# Patient Record
Sex: MALE | Race: WHITE | NOT HISPANIC OR LATINO | Employment: OTHER | URBAN - METROPOLITAN AREA
[De-identification: names, ages, dates, MRNs, and addresses within clinical notes are randomized per-mention and may not be internally consistent; named-entity substitution may affect disease eponyms.]

---

## 2019-03-08 ENCOUNTER — RECORDS - HEALTHEAST (OUTPATIENT)
Dept: GENERAL RADIOLOGY | Facility: CLINIC | Age: 64
End: 2019-03-08

## 2019-03-08 ENCOUNTER — OFFICE VISIT - HEALTHEAST (OUTPATIENT)
Dept: FAMILY MEDICINE | Facility: CLINIC | Age: 64
End: 2019-03-08

## 2019-03-08 DIAGNOSIS — S92.532A CLOSED DISPLACED FRACTURE OF DISTAL PHALANX OF LESSER TOE OF LEFT FOOT, INITIAL ENCOUNTER: ICD-10-CM

## 2019-03-08 DIAGNOSIS — S99.922A TOE INJURY, LEFT, INITIAL ENCOUNTER: ICD-10-CM

## 2019-03-08 DIAGNOSIS — S99.922A UNSPECIFIED INJURY OF LEFT FOOT, INITIAL ENCOUNTER: ICD-10-CM

## 2019-03-08 ASSESSMENT — MIFFLIN-ST. JEOR: SCORE: 1725.45

## 2019-03-18 ENCOUNTER — RECORDS - HEALTHEAST (OUTPATIENT)
Dept: ADMINISTRATIVE | Facility: OTHER | Age: 64
End: 2019-03-18

## 2021-06-02 VITALS — WEIGHT: 189.75 LBS | BODY MASS INDEX: 24.35 KG/M2 | HEIGHT: 74 IN

## 2021-06-24 NOTE — PROGRESS NOTES
"[facility] Note    Patient Name: Rito Solano  Patient Age: 63 y.o.  YOB: 1955  MRN: [id number]    Date of visit: 3/8/2019    Assessment/Plan:  No results found for this or any previous visit (from the past 24 hour(s)).  No medications were ordered this encounter      ICD-10-CM    1. Toe injury, left, initial encounter S99.922A XR Toe Left 2 or More VWS       Toe injury is pretty asymptomatic at this point, we will do an x-ray to rule out a fracture.  If no fracture, I recommend starting to ambulate.  If there is a fracture, we could use a flat shoe and/or kimani taping.    I did call patient regarding his fracture, I recommended orthopedic evaluation.  I also advised him to use kimani taping and flat bottom shoe for healing.  I advised him that he can make him here and we can do this for him if he would like.  Patient Instructions   If swelling is increasing, seek medical attention.      Counseled patient regarding treatments, treatment options, risks and benefits and diagnosis.  The patient was interactive, attentive, verbalized understanding, and we discussed plan.       There is no problem list on file for this patient.    Social History     Social History Narrative     Not on file     No family history on file.  No outpatient encounter medications on file as of 3/8/2019.     No facility-administered encounter medications on file as of 3/8/2019.        Chief Complaint:   Chief Complaint   Patient presents with     Toe Injury     Broke middle toe on the left foot x 2 1/2 weeks ago. Yesterday the front of the foot is swelling up.        /68 (Patient Site: Left Arm, Patient Position: Sitting, Cuff Size: Adult Regular)   Pulse 77   Ht 6' 2\" (1.88 m)   Wt 189 lb 12 oz (86.1 kg)   SpO2 99%   BMI 24.36 kg/m    HPI:   2 weeks ago 3rd left ip joint buckled under after he stood.  Felt pain immediately.  Has not had much pain walking but has been staying off of it somewhat to help it heal.  " "Continues to be swollen.  It is improving.  Yesterday felt a little swelling at forefoot.      No numbness.    ROS: Pertinent ros findings in hpi, all other systems negative.    Objective/Physical Exam:     /68 (Patient Site: Left Arm, Patient Position: Sitting, Cuff Size: Adult Regular)   Pulse 77   Ht 6' 2\" (1.88 m)   Wt 189 lb 12 oz (86.1 kg)   SpO2 99%   BMI 24.36 kg/m      Gen: NAD, appears age  Skin: warm, dry  HENT: normocephalic atraumatic, MMM  Eyes: non-icteric, no proptosis  CV: NRRR no m/r/g, no peripheral edema  Resp: CTAB no w/r/r, normal respiratory effort  Abd: non-distended, soft  Hematologic: No petechiae or purpura  MSK: no muscle or joint swelling  Neuro: no dysarthria or gross asymmetry  Psych: Cooperative, full affect    Left third toe distally has mild swelling, good cap refill.  Good range of motion, strength seems intact.  Not tender to palpation not tender with movement.  There is no appreciable swelling in the rest of the foot.  Dorsalis pedis is 2+.  No other edema.      Amos Sam MD  "

## 2021-07-03 NOTE — ADDENDUM NOTE
Addendum Note by Yoni Sam MD at 3/8/2019  9:20 AM     Author: Yoni Sam MD Service: -- Author Type: Physician    Filed: 3/8/2019 11:03 AM Encounter Date: 3/8/2019 Status: Signed    : Yoni Sam MD (Physician)    Addended by: OYNI SAM on: 3/8/2019 11:03 AM        Modules accepted: Orders

## 2021-10-01 ENCOUNTER — OFFICE VISIT (OUTPATIENT)
Dept: FAMILY MEDICINE | Facility: CLINIC | Age: 66
End: 2021-10-01
Payer: COMMERCIAL

## 2021-10-01 VITALS
DIASTOLIC BLOOD PRESSURE: 76 MMHG | BODY MASS INDEX: 23.71 KG/M2 | SYSTOLIC BLOOD PRESSURE: 130 MMHG | WEIGHT: 184.75 LBS | HEART RATE: 91 BPM | HEIGHT: 74 IN | RESPIRATION RATE: 17 BRPM | TEMPERATURE: 98.1 F | OXYGEN SATURATION: 96 %

## 2021-10-01 DIAGNOSIS — M25.551 BILATERAL HIP PAIN: Primary | ICD-10-CM

## 2021-10-01 DIAGNOSIS — M25.552 BILATERAL HIP PAIN: Primary | ICD-10-CM

## 2021-10-01 PROCEDURE — 99214 OFFICE O/P EST MOD 30 MIN: CPT | Performed by: FAMILY MEDICINE

## 2021-10-01 ASSESSMENT — MIFFLIN-ST. JEOR: SCORE: 1702.77

## 2021-10-01 ASSESSMENT — PAIN SCALES - GENERAL: PAINLEVEL: MILD PAIN (2)

## 2021-10-12 ENCOUNTER — TRANSFERRED RECORDS (OUTPATIENT)
Dept: HEALTH INFORMATION MANAGEMENT | Facility: CLINIC | Age: 66
End: 2021-10-12

## 2021-11-17 ENCOUNTER — TRANSFERRED RECORDS (OUTPATIENT)
Dept: HEALTH INFORMATION MANAGEMENT | Facility: CLINIC | Age: 66
End: 2021-11-17
Payer: COMMERCIAL

## 2022-03-28 ENCOUNTER — OFFICE VISIT (OUTPATIENT)
Dept: FAMILY MEDICINE | Facility: CLINIC | Age: 67
End: 2022-03-28
Payer: COMMERCIAL

## 2022-03-28 ENCOUNTER — NURSE TRIAGE (OUTPATIENT)
Dept: NURSING | Facility: CLINIC | Age: 67
End: 2022-03-28

## 2022-03-28 VITALS
WEIGHT: 187.19 LBS | DIASTOLIC BLOOD PRESSURE: 72 MMHG | BODY MASS INDEX: 24.03 KG/M2 | OXYGEN SATURATION: 99 % | SYSTOLIC BLOOD PRESSURE: 126 MMHG | HEART RATE: 74 BPM

## 2022-03-28 DIAGNOSIS — R00.2 PALPITATIONS: Primary | ICD-10-CM

## 2022-03-28 LAB
ALBUMIN SERPL-MCNC: 4.2 G/DL (ref 3.5–5)
ALP SERPL-CCNC: 68 U/L (ref 45–120)
ALT SERPL W P-5'-P-CCNC: 19 U/L (ref 0–45)
ANION GAP SERPL CALCULATED.3IONS-SCNC: 9 MMOL/L (ref 5–18)
AST SERPL W P-5'-P-CCNC: 20 U/L (ref 0–40)
BASOPHILS # BLD AUTO: 0 10E3/UL (ref 0–0.2)
BASOPHILS NFR BLD AUTO: 0 %
BILIRUB SERPL-MCNC: 0.6 MG/DL (ref 0–1)
BUN SERPL-MCNC: 17 MG/DL (ref 8–22)
CALCIUM SERPL-MCNC: 9.8 MG/DL (ref 8.5–10.5)
CHLORIDE BLD-SCNC: 101 MMOL/L (ref 98–107)
CO2 SERPL-SCNC: 31 MMOL/L (ref 22–31)
CREAT SERPL-MCNC: 0.81 MG/DL (ref 0.7–1.3)
EOSINOPHIL # BLD AUTO: 0.1 10E3/UL (ref 0–0.7)
EOSINOPHIL NFR BLD AUTO: 1 %
ERYTHROCYTE [DISTWIDTH] IN BLOOD BY AUTOMATED COUNT: 11.7 % (ref 10–15)
GFR SERPL CREATININE-BSD FRML MDRD: >90 ML/MIN/1.73M2
GLUCOSE BLD-MCNC: 96 MG/DL (ref 70–125)
HCT VFR BLD AUTO: 40.4 % (ref 40–53)
HGB BLD-MCNC: 13.9 G/DL (ref 13.3–17.7)
IMM GRANULOCYTES # BLD: 0 10E3/UL
IMM GRANULOCYTES NFR BLD: 0 %
LYMPHOCYTES # BLD AUTO: 1.5 10E3/UL (ref 0.8–5.3)
LYMPHOCYTES NFR BLD AUTO: 28 %
MCH RBC QN AUTO: 31 PG (ref 26.5–33)
MCHC RBC AUTO-ENTMCNC: 34.4 G/DL (ref 31.5–36.5)
MCV RBC AUTO: 90 FL (ref 78–100)
MONOCYTES # BLD AUTO: 0.5 10E3/UL (ref 0–1.3)
MONOCYTES NFR BLD AUTO: 8 %
NEUTROPHILS # BLD AUTO: 3.3 10E3/UL (ref 1.6–8.3)
NEUTROPHILS NFR BLD AUTO: 62 %
PLATELET # BLD AUTO: 120 10E3/UL (ref 150–450)
POTASSIUM BLD-SCNC: 4.2 MMOL/L (ref 3.5–5)
PROT SERPL-MCNC: 7.1 G/DL (ref 6–8)
RBC # BLD AUTO: 4.48 10E6/UL (ref 4.4–5.9)
SODIUM SERPL-SCNC: 141 MMOL/L (ref 136–145)
TSH SERPL DL<=0.005 MIU/L-ACNC: 1.08 UIU/ML (ref 0.3–5)
WBC # BLD AUTO: 5.3 10E3/UL (ref 4–11)

## 2022-03-28 PROCEDURE — 36415 COLL VENOUS BLD VENIPUNCTURE: CPT | Performed by: FAMILY MEDICINE

## 2022-03-28 PROCEDURE — 93005 ELECTROCARDIOGRAM TRACING: CPT | Performed by: FAMILY MEDICINE

## 2022-03-28 PROCEDURE — 80050 GENERAL HEALTH PANEL: CPT | Performed by: FAMILY MEDICINE

## 2022-03-28 PROCEDURE — 99213 OFFICE O/P EST LOW 20 MIN: CPT | Performed by: FAMILY MEDICINE

## 2022-03-28 NOTE — TELEPHONE ENCOUNTER
Triage call:     Patient calling with irregular heart beat  He feels it is beating faster than normal. He reports HR as 90 when he checks his pulse at home.   He first noticed this irregular beating 3/25.   Denies feeling dizzy, lightheaded or weak   Denies chest pain at the time of the call but he does report a slight burning sensation with some pressure in his chest that comes and goes, lasts a few minutes. He last felt this sensation last night.   He feels the irregular beats happen >4 times/min.   No history of cardiac issues. Per protocol, advised patient to be seen in office today. Care advice given. Patient verbalizes understanding and agreement with this plan. Discussed walk in clinic if no appointments available.   Transferred to scheduling.     Flakita Gibson RN   03/28/22 8:52 AM  [facility] Nurse Advisor    Reason for Disposition    Skipped or extra beat(s) and occurs 4 or more times per minute    Additional Information    Negative: Passed out (i.e., fainted, collapsed and was not responding)    Negative: Shock suspected (e.g., cold/pale/clammy skin, too weak to stand, low BP, rapid pulse)    Negative: Difficult to awaken or acting confused (e.g., disoriented, slurred speech)    Negative: Visible sweat on face or sweat dripping down face    Negative: Unable to walk, or can only walk with assistance (e.g., requires support)    Negative: Received SHOCK from implantable cardiac defibrillator and has persisting symptoms (i.e., palpitations, lightheadedness)    Negative: Dizziness, lightheadedness, or weakness and heart beating very rapidly (e.g., > 140 / minute)    Negative: Dizziness, lightheadedness, or weakness and heart beating very slowly (e.g., < 50 / minute)    Negative: Sounds like a life-threatening emergency to the triager    Negative: Chest pain    Negative: Difficulty breathing    Negative: Dizziness, lightheadedness, or weakness    Negative: Heart beating very rapidly (e.g., > 140 /  minute) and present now (Exception: during exercise)    Negative: Heart beating very slowly (e.g., < 50 / minute) (Exception: athlete)    Negative: New or worsened shortness of breath with activity (dyspnea on exertion)    Negative: Patient sounds very sick or weak to the triager    Negative: Wearing a 'Holter monitor' or 'cardiac event monitor'    Negative: Received SHOCK from implantable cardiac defibrillator (and now feels well)    Negative: Heart beating very rapidly (e.g., > 140 / minute) and not present now (Exception: during exercise)    Negative: Skipped or extra beat(s) and increases with exercise or exertion    Protocols used: HEART RATE AND HEARTBEAT [id number]

## 2022-03-28 NOTE — PROGRESS NOTES
Assessment & Plan     Palpitations    His EKG is totally normal here today, and as I listen to his heart there is no extraneous beats or irregular beats.  However he certainly could still be having some abnormalities so we will go ahead and get an Holter monitor and see what that looks like.  We can also get some blood work as listed below to make sure he is not having thyroid issues or other metabolic issues.    - EKG 12-lead, tracing only  - CBC with platelets and differential; Future  - TSH; Future  - Comprehensive metabolic panel (BMP + Alb, Alk Phos, ALT, AST, Total. Bili, TP); Future  - CBC with platelets and differential  - TSH  - Comprehensive metabolic panel (BMP + Alb, Alk Phos, ALT, AST, Total. Bili, TP)    Review of external notes as documented elsewhere in note  Review of the result(s) of each unique test - labs, holter  Ordering of each unique test  Prescription drug management             No follow-ups on file.    Fausto Martinez MD  [facility]    Calin Veras is a 66 year old who presents for the following health issues     History of Present Illness       Vascular Disease:  He presents for follow up of vascular disease.  He never takes nitroglycerin. He is not taking daily aspirin.    He eats 4 or more servings of fruits and vegetables daily.He consumes 1 sweetened beverage(s) daily.He exercises with enough effort to increase his heart rate 9 or less minutes per day.  He exercises with enough effort to increase his heart rate 3 or less days per week.   He is taking medications regularly.     He is having some irregular heartbeat feelings over the last several weeks.  He has not really had a problem with any heart problems in the past.  He has noticed a few spells of what he perceives as either premature beats or periods of irregularity they are very short.  They are not accompanied with shortness of breath or chest pain.  He just feels a heavy beat every once in a  while and wonders about if it is concerning or what should be done about it.          Review of Systems   Constitutional, HEENT, cardiovascular, pulmonary, gi and gu systems are negative, except as otherwise noted.      Objective    /72 (BP Location: Left arm, Patient Position: Sitting, Cuff Size: Adult Regular)   Pulse 74   Wt 84.9 kg (187 lb 3 oz)   SpO2 99%   BMI 24.03 kg/m    Body mass index is 24.03 kg/m .  Physical Exam   GENERAL: healthy, alert and no distress  NECK: no adenopathy, no asymmetry, masses, or scars and thyroid normal to palpation  RESP: lungs clear to auscultation - no rales, rhonchi or wheezes  CV: regular rate and rhythm, normal S1 S2, no S3 or S4, no murmur, click or rub, no peripheral edema and peripheral pulses strong  ABDOMEN: soft, nontender, no hepatosplenomegaly, no masses and bowel sounds normal  MS: no gross musculoskeletal defects noted, no edema    Results for orders placed or performed in visit on 03/28/22 (from the past 24 hour(s))   CBC with platelets and differential    Narrative    The following orders were created for panel order CBC with platelets and differential.  Procedure                               Abnormality         Status                     ---------                               -----------         ------                     CBC with platelets and d...[[id number]]  Abnormal            Final result                 Please view results for these tests on the individual orders.   CBC with platelets and differential   Result Value Ref Range    WBC Count 5.3 4.0 - 11.0 10e3/uL    RBC Count 4.48 4.40 - 5.90 10e6/uL    Hemoglobin 13.9 13.3 - 17.7 g/dL    Hematocrit 40.4 40.0 - 53.0 %    MCV 90 78 - 100 fL    MCH 31.0 26.5 - 33.0 pg    MCHC 34.4 31.5 - 36.5 g/dL    RDW 11.7 10.0 - 15.0 %    Platelet Count 120 (L) 150 - 450 10e3/uL    % Neutrophils 62 %    % Lymphocytes 28 %    % Monocytes 8 %    % Eosinophils 1 %    % Basophils 0 %    % Immature Granulocytes 0 %     Absolute Neutrophils 3.3 1.6 - 8.3 10e3/uL    Absolute Lymphocytes 1.5 0.8 - 5.3 10e3/uL    Absolute Monocytes 0.5 0.0 - 1.3 10e3/uL    Absolute Eosinophils 0.1 0.0 - 0.7 10e3/uL    Absolute Basophils 0.0 0.0 - 0.2 10e3/uL    Absolute Immature Granulocytes 0.0 <=0.4 10e3/uL

## 2022-03-28 NOTE — PROGRESS NOTES
Answers for HPI/ROS submitted by the patient on 3/28/2022  Nitroglycerin use:: never  Do you take an aspirin every day?: No  How many servings of fruits and vegetables do you eat daily?: 4 or more  On average, how many sweetened beverages do you drink each day (Examples: soda, juice, sweet tea, etc.  Do NOT count diet or artificially sweetened beverages)?: 1  How many minutes a day do you exercise enough to make your heart beat faster?: 9 or less  How many days a week do you exercise enough to make your heart beat faster?: 3 or less  How many days per week do you miss taking your medication?: 0

## 2022-04-02 ENCOUNTER — HEALTH MAINTENANCE LETTER (OUTPATIENT)
Age: 67
End: 2022-04-02

## 2022-04-06 ENCOUNTER — TELEPHONE (OUTPATIENT)
Dept: FAMILY MEDICINE | Facility: CLINIC | Age: 67
End: 2022-04-06
Payer: COMMERCIAL

## 2022-04-06 DIAGNOSIS — R00.2 PALPITATIONS: Primary | ICD-10-CM

## 2022-04-06 NOTE — TELEPHONE ENCOUNTER
Reason for Call: Request for an order    Order or referral being requested: Holter Monitor    Date needed: as soon as possible     Has the patient been seen by the PCP for this problem? YES    Additional comments:   Per Dr. Martinez last office visit notes, patient was to receive a Holter Monitor. Patient states he has not been contacted by Cardiologist to set one up. Patient would like an order put in for one. I also provided the phone number for Cardiology to patient to have him call them in case he does not here from them soon.    Phone number Patient can be reached at:  Cell number on file:    Telephone Information:   Mobile [phone]       Best Time:  anytime    Can we leave a detailed message on this number?  NO    Call taken on 4/6/2022 at 9:45 AM by Gris Kan

## 2022-04-11 ENCOUNTER — HOSPITAL ENCOUNTER (OUTPATIENT)
Dept: CARDIOLOGY | Facility: CLINIC | Age: 67
Discharge: HOME | End: 2022-04-11
Attending: FAMILY MEDICINE | Admitting: FAMILY MEDICINE
Payer: COMMERCIAL

## 2022-04-11 DIAGNOSIS — R00.2 PALPITATIONS: ICD-10-CM

## 2022-04-11 PROCEDURE — 93225 XTRNL ECG REC<48 HRS REC: CPT

## 2022-04-15 PROCEDURE — 93227 XTRNL ECG REC<48 HR R&I: CPT | Performed by: INTERNAL MEDICINE

## 2022-06-08 ENCOUNTER — NURSE TRIAGE (OUTPATIENT)
Dept: NURSING | Facility: CLINIC | Age: 67
End: 2022-06-08
Payer: COMMERCIAL

## 2022-06-08 ENCOUNTER — OFFICE VISIT (OUTPATIENT)
Dept: FAMILY MEDICINE | Facility: CLINIC | Age: 67
End: 2022-06-08
Payer: COMMERCIAL

## 2022-06-08 VITALS
BODY MASS INDEX: 23.11 KG/M2 | SYSTOLIC BLOOD PRESSURE: 134 MMHG | WEIGHT: 180 LBS | OXYGEN SATURATION: 98 % | RESPIRATION RATE: 16 BRPM | TEMPERATURE: 98 F | DIASTOLIC BLOOD PRESSURE: 77 MMHG | HEART RATE: 76 BPM

## 2022-06-08 DIAGNOSIS — K40.90 NON-RECURRENT UNILATERAL INGUINAL HERNIA WITHOUT OBSTRUCTION OR GANGRENE: Primary | ICD-10-CM

## 2022-06-08 PROCEDURE — 99213 OFFICE O/P EST LOW 20 MIN: CPT | Performed by: PHYSICIAN ASSISTANT

## 2022-06-08 NOTE — TELEPHONE ENCOUNTER
"Hernia pain patient reports.  On right side , below navel 2 \"    Feeling pain in right testicle as well.  Been having this pain for 2 days.    Patient states he is having normal activities   During the day, but is restricting activity during the day.    Perlita Bobby RN   [facility] Nurse Advisor    Reason for Disposition    MILD pain that comes and goes (cramps) lasts > 24 hours    Age > 60 years    Additional Information    Negative: Passed out (i.e., fainted, collapsed and was not responding)    Negative: Shock suspected (e.g., cold/pale/clammy skin, too weak to stand, low BP, rapid pulse)    Negative: Sounds like a life-threatening emergency to the triager    Negative: Chest pain    Negative: Pain is mainly in upper abdomen (if needed ask: 'is it mainly above the belly button?')    Negative: SEVERE abdominal pain (e.g., excruciating)    Negative: Vomiting red blood or black (coffee ground) material    Negative: Bloody, black, or tarry bowel movements (Exception: chronic-unchanged black-grey bowel movements and is taking iron pills or Pepto-bismol)    Negative: Unable to urinate (or only a few drops) and bladder feels very full    Negative: Pain in scrotum persists > 1 hour    Negative: Constant abdominal pain lasting > 2 hours    Negative: Vomiting bile (green color)    Negative: Patient sounds very sick or weak to the triager    Negative: Vomiting and abdomen looks much more swollen than usual    Negative: White of the eyes have turned yellow (i.e., jaundice)    Negative: Blood in urine (red, pink, or tea-colored)    Negative: Fever > 103 F (39.4 C)    Negative: Fever > 101 F (38.3 C) and over 60 years of age    Negative: Fever > 100.0 F (37.8 C) and has diabetes mellitus or a weak immune system (e.g., HIV positive, cancer chemotherapy, organ transplant, splenectomy, chronic steroids)    Negative: Fever > 100.0 F (37.8 C) and bedridden (e.g., nursing home patient, stroke, chronic illness, recovering " from surgery)    Protocols used: ABDOMINAL PAIN - MALE-A-OH

## 2022-06-08 NOTE — PROGRESS NOTES
Assessment & Plan:      Problem List Items Addressed This Visit    None     Visit Diagnoses     Non-recurrent unilateral inguinal hernia without obstruction or gangrene    -  Primary    Relevant Orders    Adult General Surg Referral        Medical Decision Making  Patient presents with acute onset painful mass in the right groin.  Physical exam shows signs consistent with inguinal hernia.  Given that patient is having symptoms just with short periods of standing, recommend follow-up with general surgery for likely surgical repair.  Placed referral for general surgery.  Discussed signs of worsening symptoms and when to be seen immediately for reevaluation if needed.     Subjective:      Rito Solano is a 66 year old male here for evaluation of right groin pain with a bulging mass and pain rating down into the right testicle.  Onset of symptoms was 2 days ago.  Symptoms worsen after patient is standing for long period of time.  Symptoms improve as patient sits, and completely resolves after patient sleeps at night.  Patient does not recall any specific injury, and has not been doing any heavy lifting.  He is passing his bowels normally with no dysuria.  Pain is rated 1-2 out of 10 in severity.     The following portions of the patient's history were reviewed and updated as appropriate: allergies, current medications, and problem list.     Review of Systems  Pertinent items are noted in HPI.    Allergies  No Known Allergies    Family History   Problem Relation Age of Onset     Diabetes Father      Coronary Artery Disease Brother      Heart Disease Brother      Coronary Artery Disease Brother      Heart Disease Brother        Social History     Tobacco Use     Smoking status: Never Smoker     Smokeless tobacco: Never Used   Substance Use Topics     Alcohol use: Yes     Comment: 1-2 / week        Objective:      /77   Pulse 76   Temp 98  F (36.7  C)   Resp 16   Wt 81.6 kg (180 lb)   SpO2 98%   BMI 23.11  kg/m    General appearance - alert, well appearing, and in no distress and non-toxic   Male - Tender, bulging mass along the upper inguinal region, otherwise no tenderness to testicle or scrotal palpation    The use of Dragon/ahoyDoc dictation services was used to construct the content of this note; any grammatical errors are non-intentional. Please contact the author directly if you are in need of any clarification.

## 2022-06-12 NOTE — H&P (VIEW-ONLY)
"History:  Rito Solano is a 66 year old male who was referred to general surgery by Karlos Moore PA-C for an inguinal hernia. He presents today with complaints of a bulge that he noticed in the right inguinal region last week.  With this bulge he has had associated pain.  It will radiate down into his scrotum.  He denies having much pain or bulge in the morning.  He spends a lot of time on his feet so as the day goes on he continues to become more symptomatic.  It is worse with straining.  He has not tried to reduce it.  He has not had any changes in his bowel or bladder habits.    Allergies:  Patient has no known allergies.    Past Medical History:  Denies  Has never had any colorectal cancer screening    Past Surgical History:  Left parotidectomy for infection many decades ago    Medications:  Denies    Family History:  Denies a family history of anesthesia problems    Social History:  Might have 1 alcoholic beverage per week.  Denies tobacco and illicit drug use.  Retired at the end of 2021.  He worked as a .  He now spends a lot of his time gardening.    Review of Systems:   General: No complaints or constitutional symptoms  Skin: No complaints or symptoms   Hematologic/Lymphatic: No symptoms or complaints  Psychiatric: No symptoms or complaints  Endocrine: No excessive fatigue, no hypermetabolic symptoms reported  Respiratory: No cough, shortness of breath, or wheezing  Cardiovascular: No chest pain or dyspnea on exertion  Gastrointestinal: As per HPI  Musculoskeletal: No recent injuries reported  Neurological: No focal neurologic defects reported.      Exam:  /68 (BP Location: Right arm, Patient Position: Sitting, Cuff Size: Adult Regular)   Ht 1.88 m (6' 2\")   Wt 83.2 kg (183 lb 8 oz)   BMI 23.56 kg/m    Body mass index is 23.56 kg/m .  General: Alert, cooperative, appears stated age   Skin: Skin color, texture, turgor normal, no rashes or lesions   Lymphatic: No obvious " adenopathy, no swelling   Eyes: No scleral icterus, pupils equal  HENT: No traumatic injury to the head or face, no gross abnormalities  Lungs: Normal respiratory effort, breath sounds equal bilaterally  Heart: Regular rate and rhythm  Abdomen: Clear small bulge in right inguinal region with standing.  This was easily reducible.  No appreciable umbilical or left inguinal hernia.  Musculoskeletal: No obvious swelling  Neurologic: Grossly intact      Assessment/Plan:   Rito Solano is a 66 year old male with a reducible right inguinal hernia.  I have discussed the pathophysiology of inguinal hernias at length as well as the surgical and non-operative management strategies.  In particular, the risks and benefits of repair utilizing mesh vs a primary repair.  Similarly, the risks and benefits to the surgical approach - laparoscopic vs robotic vs open inguinal hernia surgery.  We discussed the risks of hernia surgery itself which include, but are not limited to, bleeding, infection of the mesh, recurrence of the hernia, chronic pain, poor cosmesis, the need for reoperative intervention, and injury to vital structures.  Additionally, the risks of observation were also discussed in detail which include, but are not limited to, chronic pain, enlargement of the hernia, incarceration, and strangulation.      He understands everything that was discussed and has consented to proceed with surgery.  Preoperative orders have been placed for an open right inguinal hernia repair at the outpatient surgery center under MAC anesthesia.  Postoperative recovery and restrictions were discussed.  With this being a small hernia, I anticipate 2 to 4 weeks of lifting restrictions.  My surgery scheduler will give him a call to pick a date for an inguinal hernia repair at his desired date.  He would like to have the hernia repaired as soon as possible due to his symptoms.  He would then follow-up in the office 2 weeks after surgery for wound  check.    Elyse Murrell DO  General Surgeon  [facility]  Surgery [address]  [place] [address]  Office: [phone]  Employed by - [facility]

## 2022-06-12 NOTE — PROGRESS NOTES
"History:  Rito Solano is a 66 year old male who was referred to general surgery by Karlos Moore PA-C for an inguinal hernia. He presents today with complaints of a bulge that he noticed in the right inguinal region last week.  With this bulge he has had associated pain.  It will radiate down into his scrotum.  He denies having much pain or bulge in the morning.  He spends a lot of time on his feet so as the day goes on he continues to become more symptomatic.  It is worse with straining.  He has not tried to reduce it.  He has not had any changes in his bowel or bladder habits.    Allergies:  Patient has no known allergies.    Past Medical History:  Denies  Has never had any colorectal cancer screening    Past Surgical History:  Left parotidectomy for infection many decades ago    Medications:  Denies    Family History:  Denies a family history of anesthesia problems    Social History:  Might have 1 alcoholic beverage per week.  Denies tobacco and illicit drug use.  Retired at the end of 2021.  He worked as a .  He now spends a lot of his time gardening.    Review of Systems:   General: No complaints or constitutional symptoms  Skin: No complaints or symptoms   Hematologic/Lymphatic: No symptoms or complaints  Psychiatric: No symptoms or complaints  Endocrine: No excessive fatigue, no hypermetabolic symptoms reported  Respiratory: No cough, shortness of breath, or wheezing  Cardiovascular: No chest pain or dyspnea on exertion  Gastrointestinal: As per HPI  Musculoskeletal: No recent injuries reported  Neurological: No focal neurologic defects reported.      Exam:  /68 (BP Location: Right arm, Patient Position: Sitting, Cuff Size: Adult Regular)   Ht 1.88 m (6' 2\")   Wt 83.2 kg (183 lb 8 oz)   BMI 23.56 kg/m    Body mass index is 23.56 kg/m .  General: Alert, cooperative, appears stated age   Skin: Skin color, texture, turgor normal, no rashes or lesions   Lymphatic: No obvious " adenopathy, no swelling   Eyes: No scleral icterus, pupils equal  HENT: No traumatic injury to the head or face, no gross abnormalities  Lungs: Normal respiratory effort, breath sounds equal bilaterally  Heart: Regular rate and rhythm  Abdomen: Clear small bulge in right inguinal region with standing.  This was easily reducible.  No appreciable umbilical or left inguinal hernia.  Musculoskeletal: No obvious swelling  Neurologic: Grossly intact      Assessment/Plan:   Rito Solano is a 66 year old male with a reducible right inguinal hernia.  I have discussed the pathophysiology of inguinal hernias at length as well as the surgical and non-operative management strategies.  In particular, the risks and benefits of repair utilizing mesh vs a primary repair.  Similarly, the risks and benefits to the surgical approach - laparoscopic vs robotic vs open inguinal hernia surgery.  We discussed the risks of hernia surgery itself which include, but are not limited to, bleeding, infection of the mesh, recurrence of the hernia, chronic pain, poor cosmesis, the need for reoperative intervention, and injury to vital structures.  Additionally, the risks of observation were also discussed in detail which include, but are not limited to, chronic pain, enlargement of the hernia, incarceration, and strangulation.      He understands everything that was discussed and has consented to proceed with surgery.  Preoperative orders have been placed for an open right inguinal hernia repair at the outpatient surgery center under MAC anesthesia.  Postoperative recovery and restrictions were discussed.  With this being a small hernia, I anticipate 2 to 4 weeks of lifting restrictions.  My surgery scheduler will give him a call to pick a date for an inguinal hernia repair at his desired date.  He would like to have the hernia repaired as soon as possible due to his symptoms.  He would then follow-up in the office 2 weeks after surgery for wound  check.    Elyse Murrell DO  General Surgeon  [facility]  Surgery [address]  [place] [address]  Office: [phone]  Employed by - [facility]

## 2022-06-13 ENCOUNTER — OFFICE VISIT (OUTPATIENT)
Dept: SURGERY | Facility: CLINIC | Age: 67
End: 2022-06-13
Attending: PHYSICIAN ASSISTANT
Payer: COMMERCIAL

## 2022-06-13 VITALS
BODY MASS INDEX: 23.55 KG/M2 | WEIGHT: 183.5 LBS | SYSTOLIC BLOOD PRESSURE: 128 MMHG | DIASTOLIC BLOOD PRESSURE: 68 MMHG | HEIGHT: 74 IN

## 2022-06-13 DIAGNOSIS — K40.90 NON-RECURRENT UNILATERAL INGUINAL HERNIA WITHOUT OBSTRUCTION OR GANGRENE: ICD-10-CM

## 2022-06-13 PROCEDURE — 99243 OFF/OP CNSLTJ NEW/EST LOW 30: CPT | Performed by: SURGERY

## 2022-06-13 NOTE — LETTER
6/13/2022         RE: Rito Solano  [address] [place] [address]        Dear Colleague,    Thank you for referring your patient, Rito Solano, to the [facility] [place]. Please see a copy of my visit note below.    History:  Rito Solano is a 66 year old male who was referred to general surgery by Karlos Moore PA-C for an inguinal hernia. He presents today with complaints of a bulge that he noticed in the right inguinal region last week.  With this bulge he has had associated pain.  It will radiate down into his scrotum.  He denies having much pain or bulge in the morning.  He spends a lot of time on his feet so as the day goes on he continues to become more symptomatic.  It is worse with straining.  He has not tried to reduce it.  He has not had any changes in his bowel or bladder habits.    Allergies:  Patient has no known allergies.    Past Medical History:  Denies  Has never had any colorectal cancer screening    Past Surgical History:  Left parotidectomy for infection many decades ago    Medications:  Denies    Family History:  Denies a family history of anesthesia problems    Social History:  Might have 1 alcoholic beverage per week.  Denies tobacco and illicit drug use.  Retired at the end of 2021.  He worked as a .  He now spends a lot of his time gardening.    Review of Systems:   General: No complaints or constitutional symptoms  Skin: No complaints or symptoms   Hematologic/Lymphatic: No symptoms or complaints  Psychiatric: No symptoms or complaints  Endocrine: No excessive fatigue, no hypermetabolic symptoms reported  Respiratory: No cough, shortness of breath, or wheezing  Cardiovascular: No chest pain or dyspnea on exertion  Gastrointestinal: As per HPI  Musculoskeletal: No recent injuries reported  Neurological: No focal neurologic defects reported.      Exam:  /68 (BP Location: Right arm, Patient Position: Sitting, Cuff  "Size: Adult Regular)   Ht 1.88 m (6' 2\")   Wt 83.2 kg (183 lb 8 oz)   BMI 23.56 kg/m    Body mass index is 23.56 kg/m .  General: Alert, cooperative, appears stated age   Skin: Skin color, texture, turgor normal, no rashes or lesions   Lymphatic: No obvious adenopathy, no swelling   Eyes: No scleral icterus, pupils equal  HENT: No traumatic injury to the head or face, no gross abnormalities  Lungs: Normal respiratory effort, breath sounds equal bilaterally  Heart: Regular rate and rhythm  Abdomen: Clear small bulge in right inguinal region with standing.  This was easily reducible.  No appreciable umbilical or left inguinal hernia.  Musculoskeletal: No obvious swelling  Neurologic: Grossly intact      Assessment/Plan:   Rito Solano is a 66 year old male with a reducible right inguinal hernia.  I have discussed the pathophysiology of inguinal hernias at length as well as the surgical and non-operative management strategies.  In particular, the risks and benefits of repair utilizing mesh vs a primary repair.  Similarly, the risks and benefits to the surgical approach - laparoscopic vs robotic vs open inguinal hernia surgery.  We discussed the risks of hernia surgery itself which include, but are not limited to, bleeding, infection of the mesh, recurrence of the hernia, chronic pain, poor cosmesis, the need for reoperative intervention, and injury to vital structures.  Additionally, the risks of observation were also discussed in detail which include, but are not limited to, chronic pain, enlargement of the hernia, incarceration, and strangulation.      He understands everything that was discussed and has consented to proceed with surgery.  Preoperative orders have been placed for an open right inguinal hernia repair at the outpatient surgery center under MAC anesthesia.  Postoperative recovery and restrictions were discussed.  With this being a small hernia, I anticipate 2 to 4 weeks of lifting restrictions.  My " surgery scheduler will give him a call to pick a date for an inguinal hernia repair at his desired date.  He would like to have the hernia repaired as soon as possible due to his symptoms.  He would then follow-up in the office 2 weeks after surgery for wound check.    Elyse Murrell DO  General Surgeon  [facility]  Surgery [address]  [place] [address]  Office: [phone]  Employed by - [facility]        Again, thank you for allowing me to participate in the care of your patient.        Sincerely,        Elyse Murrell DO

## 2022-06-15 ENCOUNTER — TELEPHONE (OUTPATIENT)
Dept: SURGERY | Facility: CLINIC | Age: 67
End: 2022-06-15
Payer: COMMERCIAL

## 2022-06-15 NOTE — TELEPHONE ENCOUNTER
Spoke with Rito lyles regarding surgery scheduling     Patient was informed of the followin. Patient has been informed to consult their PCP/Cardiologist about stopping their blood thinners about a week before surgery.  2. Pre Op Physical will need to be done by PCP or Surgeon see below  3. Required Covid Test with in 4 days prior to surgery. (See Date Below)  4. Ride required after surgery, can not use [employer] or public transportation.    Patient was informed that failure to do so may result in cancellation of surgery      We've received instruction to get you scheduled for surgery with Dr Murrell. We have that arranged as follows:     Pre-op Physical:  Dr. Murrell will do your pre op physical the day of surgery    Surgery Date: 2022     Location: [facility], [address] [place] [address]. [address] [place] [address]    Approximate Arrival Time: 8:00 am  (Unless instructed differently by the pre-op call nurse)     [facility]: [facility] [place] [address].    Prep Tasks and Info:   1. Review your medications with your primary care or prescribing physician; they will advise you which meds to stop and when, and when you can resume taking.  Certain medications needs to be stopped in advance of surgery to proceed safely.    2. You must get tested for COVID-19, even if you are vaccinated.    Outpatient Surgery:  If you are going home the same day of surgery, a home rapid antigen Covid-19 test is required 1-2 days before surgery- regardless of your vaccination status.  Take a photo of the negative result and show to the nurse on the day of surgery. If you test positive, contact our office right away to reschedule surgery. You can buy a home Covid-19 Rapid Antigen test at many local pharmacies, or you can order for free at covid.gov/tests.    Admits after Surgery:  If you are staying overnight or  longer following surgery, a PCR test is required 4 days before surgery instead of the rapid antigen test.   Please schedule a PCR test with [facility] by calling [phone] or visit [email or web address].  You are permitted to have this done outside of our system but must fax the result to [phone].     3. Please shower the evening before and morning of surgery with Hibiclens or Exidine soap.  This can be found at your local pharmacy.    4. Fasting instructions will be provided by the pre-op nurse who will call you 1-3 days before surgery.  Typically we advise normal food up to 8 hours before surgery then clear liquids only up to 2 hours before surgery then nothing at all by mouth for 2 hours including no gum or candy.  The nurse will review your specific instructions with you at the call.      5. You will need an adult to drive you home and stay with you 24 hours after surgery. Public transportation or Medical Van Services are not permitted.    6. You may have one family member wait in the lobby at the surgery center during your surgery. Visitor restrictions are subject to change, please verify with the pre-op nurse when they call.    7. If the community sees a new COVID19 surge, your procedure may need to be postponed. We will contact you if this happens. You will be screened for high-risk exposure to Covid-19 during the pre-op call.  We encourage you to quarantine yourself away from any Covid-19 people for 10 days before surgery to avoid possible last minute cancellations.   When you arrive to the surgery center, you will again be screened for COVID19 symptoms. If you screen positive, your surgery will need to be postponed.    8. We always encourage you to notify your insurance any time you have medical tests or procedures scheduled including surgery. The number is usually right on the back of your insurance card. Please call [facility] Cost of Care at [phone] if you'd  like a surgery quote.       Call our office if you have any questions! Thank you!           Surgery Letter sent via [employer]

## 2022-06-15 NOTE — LETTER
We've received instruction to get you scheduled for surgery with Dr Murrell. We have that arranged as follows:     Pre-op Physical:  Dr. Murrell will do your pre op physical the day of surgery    Surgery Date: 7/12/2022     Location: [facility], [address] [place] [address] [place]. [address] [address]    Approximate Arrival Time: 8:00 am  (Unless instructed differently by the pre-op call nurse)     Post op Appointment: [facility]27/20[facility], [address] [place] [address] [address] [place] [address].    Prep Tasks and Info:   1. Review your medications with your primary care or prescribing physician; they will advise you which meds to stop and when, and when you can resume taking.  Certain medications needs to be stopped in advance of surgery to proceed safely.    2. You must get tested for COVID-19, even if you are vaccinated.    Outpatient Surgery:  If you are going home the same day of surgery, a home rapid antigen Covid-19 test is required 1-2 days before surgery- regardless of your vaccination status.  Take a photo of the negative result and show to the nurse on the day of surgery. If you test positive, contact our office right away to reschedule surgery. You can buy a home Covid-19 Rapid Antigen test at many local pharmacies, or you can order for free at covid.gov/tests.    Admits after Surgery:  If you are staying overnight or longer following surgery, a PCR test is required 4 days before surgery instead of the rapid antigen test.   Please schedule a PCR test with [facility] by calling [phone] or visit [email or web address].  You are permitted to have this done outside of our system but must fax the result to [phone].     3. Please shower the evening before and morning of surgery with Hibiclens or Exidine soap.  This can be found at your local pharmacy.    4. Fasting instructions will be provided by the pre-op nurse who  will call you 1-3 days before surgery.  Typically we advise normal food up to 8 hours before surgery then clear liquids only up to 2 hours before surgery then nothing at all by mouth for 2 hours including no gum or candy.  The nurse will review your specific instructions with you at the call.      5. You will need an adult to drive you home and stay with you 24 hours after surgery. Public transportation or Medical Van Services are not permitted.    6. You may have one family member wait in the lobby at the surgery center during your surgery. Visitor restrictions are subject to change, please verify with the pre-op nurse when they call.    7. If the community sees a new COVID19 surge, your procedure may need to be postponed. We will contact you if this happens. You will be screened for high-risk exposure to Covid-19 during the pre-op call.  We encourage you to quarantine yourself away from any Covid-19 people for 10 days before surgery to avoid possible last minute cancellations.   When you arrive to the surgery center, you will again be screened for COVID19 symptoms. If you screen positive, your surgery will need to be postponed.    8. We always encourage you to notify your insurance any time you have medical tests or procedures scheduled including surgery. The number is usually right on the back of your insurance card. Please call [facility] Cost of Care at [phone] if you'd like a surgery quote.       Call our office if you have any questions! Thank you!

## 2022-06-17 PROBLEM — K40.90 NON-RECURRENT UNILATERAL INGUINAL HERNIA WITHOUT OBSTRUCTION OR GANGRENE: Status: ACTIVE | Noted: 2022-06-17

## 2022-07-11 ENCOUNTER — ANESTHESIA EVENT (OUTPATIENT)
Dept: SURGERY | Facility: AMBULATORY SURGERY CENTER | Age: 67
End: 2022-07-11
Payer: COMMERCIAL

## 2022-07-12 ENCOUNTER — ANESTHESIA (OUTPATIENT)
Dept: SURGERY | Facility: AMBULATORY SURGERY CENTER | Age: 67
End: 2022-07-12
Payer: COMMERCIAL

## 2022-07-12 ENCOUNTER — HOSPITAL ENCOUNTER (OUTPATIENT)
Facility: AMBULATORY SURGERY CENTER | Age: 67
Discharge: HOME | End: 2022-07-12
Attending: SURGERY
Payer: COMMERCIAL

## 2022-07-12 VITALS
SYSTOLIC BLOOD PRESSURE: 124 MMHG | TEMPERATURE: 97.6 F | HEART RATE: 74 BPM | BODY MASS INDEX: 23.49 KG/M2 | HEIGHT: 74 IN | WEIGHT: 183 LBS | OXYGEN SATURATION: 98 % | RESPIRATION RATE: 16 BRPM | DIASTOLIC BLOOD PRESSURE: 60 MMHG

## 2022-07-12 DIAGNOSIS — K40.90 NON-RECURRENT UNILATERAL INGUINAL HERNIA WITHOUT OBSTRUCTION OR GANGRENE: ICD-10-CM

## 2022-07-12 DIAGNOSIS — G89.18 POSTOPERATIVE PAIN: Primary | ICD-10-CM

## 2022-07-12 PROCEDURE — 49650 LAP ING HERNIA REPAIR INIT: CPT | Mod: RT | Performed by: SURGERY

## 2022-07-12 DEVICE — SURGIPRO PLUG & PATCH MEDIUM  MESH SMPM02: Type: IMPLANTABLE DEVICE | Site: GROIN | Status: FUNCTIONAL

## 2022-07-12 RX ORDER — ONDANSETRON 4 MG/1
4 TABLET, ORALLY DISINTEGRATING ORAL EVERY 30 MIN PRN
Status: DISCONTINUED | OUTPATIENT
Start: 2022-07-12 | End: 2022-07-13 | Stop reason: HOSPADM

## 2022-07-12 RX ORDER — HYDROMORPHONE HCL IN WATER/PF 6 MG/30 ML
0.2 PATIENT CONTROLLED ANALGESIA SYRINGE INTRAVENOUS EVERY 5 MIN PRN
Status: CANCELLED | OUTPATIENT
Start: 2022-07-12

## 2022-07-12 RX ORDER — TRAMADOL HYDROCHLORIDE 50 MG/1
50 TABLET ORAL EVERY 6 HOURS PRN
Qty: 10 TABLET | Refills: 0 | Status: SHIPPED | OUTPATIENT
Start: 2022-07-12 | End: 2022-07-15

## 2022-07-12 RX ORDER — FENTANYL CITRATE 0.05 MG/ML
25 INJECTION, SOLUTION INTRAMUSCULAR; INTRAVENOUS
Status: DISCONTINUED | OUTPATIENT
Start: 2022-07-12 | End: 2022-07-13 | Stop reason: HOSPADM

## 2022-07-12 RX ORDER — SODIUM CHLORIDE, SODIUM LACTATE, POTASSIUM CHLORIDE, CALCIUM CHLORIDE 600; 310; 30; 20 MG/100ML; MG/100ML; MG/100ML; MG/100ML
INJECTION, SOLUTION INTRAVENOUS CONTINUOUS
Status: CANCELLED | OUTPATIENT
Start: 2022-07-12

## 2022-07-12 RX ORDER — BUPIVACAINE HYDROCHLORIDE AND EPINEPHRINE 2.5; 5 MG/ML; UG/ML
INJECTION, SOLUTION INFILTRATION; PERINEURAL PRN
Status: DISCONTINUED | OUTPATIENT
Start: 2022-07-12 | End: 2022-07-12 | Stop reason: HOSPADM

## 2022-07-12 RX ORDER — LIDOCAINE HYDROCHLORIDE 20 MG/ML
INJECTION, SOLUTION INFILTRATION; PERINEURAL PRN
Status: DISCONTINUED | OUTPATIENT
Start: 2022-07-12 | End: 2022-07-12

## 2022-07-12 RX ORDER — SODIUM CHLORIDE, SODIUM LACTATE, POTASSIUM CHLORIDE, CALCIUM CHLORIDE 600; 310; 30; 20 MG/100ML; MG/100ML; MG/100ML; MG/100ML
INJECTION, SOLUTION INTRAVENOUS CONTINUOUS
Status: DISCONTINUED | OUTPATIENT
Start: 2022-07-12 | End: 2022-07-13 | Stop reason: HOSPADM

## 2022-07-12 RX ORDER — CEFAZOLIN SODIUM 2 G/100ML
2 INJECTION, SOLUTION INTRAVENOUS
Status: COMPLETED | OUTPATIENT
Start: 2022-07-12 | End: 2022-07-12

## 2022-07-12 RX ORDER — LIDOCAINE 40 MG/G
CREAM TOPICAL
Status: DISCONTINUED | OUTPATIENT
Start: 2022-07-12 | End: 2022-07-13 | Stop reason: HOSPADM

## 2022-07-12 RX ORDER — KETOROLAC TROMETHAMINE 30 MG/ML
30 INJECTION, SOLUTION INTRAMUSCULAR; INTRAVENOUS EVERY 6 HOURS PRN
Status: DISCONTINUED | OUTPATIENT
Start: 2022-07-12 | End: 2022-07-13 | Stop reason: HOSPADM

## 2022-07-12 RX ORDER — DEXAMETHASONE SODIUM PHOSPHATE 4 MG/ML
INJECTION, SOLUTION INTRA-ARTICULAR; INTRALESIONAL; INTRAMUSCULAR; INTRAVENOUS; SOFT TISSUE PRN
Status: DISCONTINUED | OUTPATIENT
Start: 2022-07-12 | End: 2022-07-12

## 2022-07-12 RX ORDER — FENTANYL CITRATE 0.05 MG/ML
25 INJECTION, SOLUTION INTRAMUSCULAR; INTRAVENOUS EVERY 5 MIN PRN
Status: CANCELLED | OUTPATIENT
Start: 2022-07-12

## 2022-07-12 RX ORDER — MEPERIDINE HYDROCHLORIDE 25 MG/ML
12.5 INJECTION INTRAMUSCULAR; INTRAVENOUS; SUBCUTANEOUS
Status: DISCONTINUED | OUTPATIENT
Start: 2022-07-12 | End: 2022-07-13 | Stop reason: HOSPADM

## 2022-07-12 RX ORDER — KETOROLAC TROMETHAMINE 30 MG/ML
30 INJECTION, SOLUTION INTRAMUSCULAR; INTRAVENOUS EVERY 6 HOURS PRN
Status: DISCONTINUED | OUTPATIENT
Start: 2022-07-12 | End: 2022-07-12

## 2022-07-12 RX ORDER — KETOROLAC TROMETHAMINE 30 MG/ML
30 INJECTION, SOLUTION INTRAMUSCULAR; INTRAVENOUS EVERY 6 HOURS PRN
Status: CANCELLED | OUTPATIENT
Start: 2022-07-12 | End: 2022-07-17

## 2022-07-12 RX ORDER — LIDOCAINE 40 MG/G
CREAM TOPICAL
Status: CANCELLED | OUTPATIENT
Start: 2022-07-12

## 2022-07-12 RX ORDER — ACETAMINOPHEN 325 MG/1
975 TABLET ORAL ONCE
Status: COMPLETED | OUTPATIENT
Start: 2022-07-12 | End: 2022-07-12

## 2022-07-12 RX ORDER — OXYCODONE HYDROCHLORIDE 5 MG/1
5 TABLET ORAL EVERY 4 HOURS PRN
Status: DISCONTINUED | OUTPATIENT
Start: 2022-07-12 | End: 2022-07-13 | Stop reason: HOSPADM

## 2022-07-12 RX ORDER — ONDANSETRON 2 MG/ML
4 INJECTION INTRAMUSCULAR; INTRAVENOUS EVERY 30 MIN PRN
Status: DISCONTINUED | OUTPATIENT
Start: 2022-07-12 | End: 2022-07-13 | Stop reason: HOSPADM

## 2022-07-12 RX ORDER — PROPOFOL 10 MG/ML
INJECTION, EMULSION INTRAVENOUS PRN
Status: DISCONTINUED | OUTPATIENT
Start: 2022-07-12 | End: 2022-07-12

## 2022-07-12 RX ORDER — ONDANSETRON 2 MG/ML
INJECTION INTRAMUSCULAR; INTRAVENOUS PRN
Status: DISCONTINUED | OUTPATIENT
Start: 2022-07-12 | End: 2022-07-12

## 2022-07-12 RX ORDER — PROPOFOL 10 MG/ML
INJECTION, EMULSION INTRAVENOUS CONTINUOUS PRN
Status: DISCONTINUED | OUTPATIENT
Start: 2022-07-12 | End: 2022-07-12

## 2022-07-12 RX ORDER — KETOROLAC TROMETHAMINE 15 MG/ML
15 INJECTION, SOLUTION INTRAMUSCULAR; INTRAVENOUS EVERY 6 HOURS PRN
Status: CANCELLED | OUTPATIENT
Start: 2022-07-12 | End: 2022-07-17

## 2022-07-12 RX ADMIN — PROPOFOL 50 MG: 10 INJECTION, EMULSION INTRAVENOUS at 09:56

## 2022-07-12 RX ADMIN — PROPOFOL 120 MCG/KG/MIN: 10 INJECTION, EMULSION INTRAVENOUS at 09:37

## 2022-07-12 RX ADMIN — PROPOFOL 50 MG: 10 INJECTION, EMULSION INTRAVENOUS at 09:37

## 2022-07-12 RX ADMIN — DEXAMETHASONE SODIUM PHOSPHATE 4 MG: 4 INJECTION, SOLUTION INTRA-ARTICULAR; INTRALESIONAL; INTRAMUSCULAR; INTRAVENOUS; SOFT TISSUE at 09:51

## 2022-07-12 RX ADMIN — CEFAZOLIN SODIUM 2 G: 2 INJECTION, SOLUTION INTRAVENOUS at 09:43

## 2022-07-12 RX ADMIN — SODIUM CHLORIDE, SODIUM LACTATE, POTASSIUM CHLORIDE, CALCIUM CHLORIDE: 600; 310; 30; 20 INJECTION, SOLUTION INTRAVENOUS at 08:34

## 2022-07-12 RX ADMIN — LIDOCAINE HYDROCHLORIDE 60 MG: 20 INJECTION, SOLUTION INFILTRATION; PERINEURAL at 09:37

## 2022-07-12 RX ADMIN — ONDANSETRON 4 MG: 2 INJECTION INTRAMUSCULAR; INTRAVENOUS at 09:51

## 2022-07-12 RX ADMIN — ACETAMINOPHEN 975 MG: 325 TABLET ORAL at 08:24

## 2022-07-12 NOTE — DISCHARGE INSTRUCTIONS
If you have any questions or concerns regarding your procedure, please contact Dr. Elyse Murrell, her office number is [phone].    You received 975 mg of acetaminophen (Tylenol) at 8:24 AM. Please do not take an additional dose of Tylenol until after 2:24 PM.    Do not exceed 4,000 mg of acetaminophen in a 24 hour period, keep in mind that acetaminophen can also be found in many over-the-counter cold medications as well as narcotics that may be given for pain.    Hernia Repair (Adult)    If you have any questions or concerns regarding your procedure, please contact Dr. Elyse Murrell, her office number is [phone].    A hernia can happen when there is a weakness or defect in the wall of the abdomen or groin. Intestines or nearby tissues may move from their usual location and push through the weakness in the wall. This can cause a hernia (bulge) you may see or feel.    Causes and risk factors     A hernia may be present at birth. Or it may be caused by the wear and tear of daily living. Certain factors can make a hernia more likely. These can include:  Heavy lifting  Straining, whether from lifting, movement, or constipation  Chronic cough  Injury to the abdominal wall  Excess weight  Pregnancy  Prior surgery  Older age  Family history of hernia    Symptoms    Symptoms of a hernia may come on suddenly. Or they may appear slowly over time. Some common symptoms include:  Bulge in the groin area, around the navel, or in the scrotum (the bulge may get bigger when you stand and go away when you lie down)  Pain or pressure around the bulge  Pain during activities such as lifting, coughing, or sneezing  A feeling of weakness or pressure in the groin  Pain or swelling in the scrotum    Types of hernias    There are different types of hernia. The type you have depends on its location:  Inguinal. This type is in the groin or scrotum.  Femoral. This type is in the groin, upper thigh (where the leg bends), or  labia.  Ventral. This type is in the abdominal wall.  Umbilical. This type occurs around the navel (belly button).  Incisional. This type occurs at the site of a previous surgery.  The condition of the hernia can help determine how urgently it needs to be treated.  Reducible. It goes back in by itself, or it can be pushed back in.  Irreducible. It can t be pushed back in.  Incarcerated/strangulated. The intestine is trapped (incarcerated). If this happens, you won t be able to push the bulge back in. If the incarcerated hernia isn t treated, it may become strangulated. This means the area loses blood supply and the tissue may die. This requires emergency surgery. You need treatment right away.  In most cases, a hernia will not heal on its own.You may need surgery to repair the defect in the abdominal wall or groin. You ll be told more about surgery, if needed.  If your symptoms are not severe, treatment may sometimes be delayed. In such cases, you will need regular follow-up visits with the provider. You ll be asked to keep track of your symptoms and to watch for signs of more serious problems. You may also be given guidelines similar to the home care instructions below.    Home care    To help keep a hernia from getting worse, you may be advised to:  Avoid heavy lifting and straining as directed.  Take steps to prevent constipation, such as eating more fiber and drinking more water. This may help reduce straining that can occur when having a bowel movement. Reducing straining may help keep your symptoms from getting worse.  Maintain a healthy weight or lose excess weight. This can help reduce strain on abdominal muscles and tissues.  Stop smoking. This can help prevent coughing that may also strain abdominal muscles and tissues.    Follow-up care    Follow up with your healthcare provider, or as directed. If imaging tests were done, they will be reviewed a doctor. You will be told the results and any new findings  that may affect your care.    When to seek medical advice    Call your healthcare provider right away if any of these occur:  Severe pain, redness, or tenderness in the area near the hernia  Pain worsens quickly and doesn t get better  Inability to have a bowel movement or pass gas  Fever of 101.5 F (38.6 C) or higher  Hernia hardens, swells, or grows larger  Hernia can no longer be pushed back in  Pain moves to the lower right abdomen (just below the waistline), or spreads to the back    Coping with pain    *Pain after surgery is normal and expected*    If you have pain after surgery, pain medicine will help you feel better. Take it as told, before pain becomes severe. Also, ask your healthcare provider or pharmacist about other ways to control pain. This might be with heat, ice, or relaxation. And follow any other instructions your surgeon or nurse gives you.    Tips for taking pain medicine    To get the best relief possible, remember these points:    Pain medicines can upset your stomach. Taking them with a little food may help.  Most pain relievers taken by mouth need at least 20 to 30 minutes to start to work.  Don't wait till your pain becomes severe before you take your medicine. Try to time your medicine so that you can take it before starting an activity. This might be before you get dressed, go for a walk, or sit down for dinner.  Constipation is a common side effect of pain medicines. You can take medicines such as laxatives (Miralax) or stool softeners to help ease constipation. Drinking lots of fluids and eating foods such as fruits and vegetables that are high in fiber can also help.  Drinking alcohol and taking pain medicine can cause dizziness and slow your breathing. It can even be deadly. Don't drink alcohol while taking pain medicine.  Pain medicine can make you react more slowly to things. Don't drive or run machinery while taking pain medicine.  Your healthcare provider may tell you to take  acetaminophen to help ease your pain. Ask him or her how much you are supposed to take each day. Acetaminophen or other pain relievers may interact with your prescription medicines or other over-the-counter (OTC) medicines. Some prescription medicines have acetaminophen and other ingredients. Using both prescription and OTC acetaminophen for pain can cause you to overdose. Read the labels on your OTC medicines with care. This will help you to clearly know the list of ingredients, how much to take, and any warnings. It may also help you not take too much acetaminophen. If you have questions or don't understand the information, ask your pharmacist or healthcare provider to explain it to you before you take the OTC medicine.    Discharge Instructions: After Your Surgery, regarding Anesthesia    You ve just had surgery. During surgery, you were given medicine called anesthesia to keep you relaxed and free of pain. After surgery, you may have some pain or nausea. This is common. Here are some tips for feeling better and getting well after surgery.    Going home    Your healthcare provider will show you how to take care of yourself when you go home. He or she will also answer your questions. Have an adult family member or friend drive you home. For the first 24 hours after your surgery:    Don't drive or use heavy equipment.  Don't make important decisions or sign legal papers.  Don't drink alcohol.  Have someone stay with you for the next 24 hours. He or she can watch for problems and help keep you safe.  Be sure to go to all follow-up visits with your healthcare provider. And rest after your surgery for as long as your healthcare provider tells you to.    Managing nausea    Some people have an upset stomach after surgery. This is often because of anesthesia, pain, or pain medicine, or the stress of surgery. These tips will help you handle nausea and eat healthy foods as you get better. If you were on a special food plan  before surgery, ask your healthcare provider if you should follow it while you get better. These tips may help:    Don't push yourself to eat. Your body will tell you when to eat and how much.  Start off with clear liquids and soup. They are easier to digest.  Next try semi-solid foods, such as mashed potatoes, applesauce, and gelatin, as you feel ready.  Slowly move to solid foods. Don t eat fatty, rich, or spicy foods at first.  Don't force yourself to have 3 large meals a day. Instead eat smaller amounts more often.  Take pain medicines with a small amount of solid food, such as crackers or toast, to prevent nausea.    If you have obstructive sleep apnea    You were given anesthesia medicine during surgery to keep you comfortable and free of pain. After surgery, you may have more apnea spells because of this medicine and other medicines you were given. The spells may last longer than usual.   At home:    Keep using the continuous positive airway pressure (CPAP) device when you sleep. Unless your healthcare provider tells you not to, use it when you sleep, day or night. CPAP is a common device used to treat obstructive sleep apnea.  Talk with your provider before taking any pain medicine, muscle relaxants, or sedatives. Your provider will tell you about the possible dangers of taking these medicines.    You received an IV medication today called Toradol. You received this medication at 1130amThis is a NSAID. Therefore, do not take any NSAIDS (Ibuprofen products, Advil, Motrin, etc) until 530pm.

## 2022-07-12 NOTE — ANESTHESIA POSTPROCEDURE EVALUATION
Patient: Rito Solano    Procedure: Procedure(s):  HERNIORRHAPHY, INGUINAL, OPEN       Anesthesia Type:  MAC    Note:  Disposition: Outpatient   Postop Pain Control: Uneventful            Sign Out: Well controlled pain   PONV: No   Neuro/Psych: Uneventful            Sign Out: Acceptable/Baseline neuro status   Airway/Respiratory: Uneventful            Sign Out: Acceptable/Baseline resp. status   CV/Hemodynamics: Uneventful            Sign Out: Acceptable CV status; No obvious hypovolemia; No obvious fluid overload   Other NRE: NONE   DID A NON-ROUTINE EVENT OCCUR? No           Last vitals:  Vitals Value Taken Time   /60 07/12/22 1210   Temp 97.6  F (36.4  C) 07/12/22 1034   Pulse 74 07/12/22 1210   Resp 16 07/12/22 1210   SpO2 98 % 07/12/22 1210       Electronically Signed By: Terrie Joiner MD  July 12, 2022  12:43 PM

## 2022-07-12 NOTE — PROGRESS NOTES
Pt and family verbalize good understanding of discharge teach and follow up with MD.   VSS,Surgical incision CDI. D/C criteria met. Pt verbalizes readiness to go home.   Discharge instructions called to wife, unable to be in pt room due to not wearing a mask.   Pt ambulated to rest room prior to discharge   @ME2@ 7/12/2022 12:29 PM

## 2022-07-12 NOTE — ANESTHESIA PREPROCEDURE EVALUATION
Anesthesia Pre-Procedure Evaluation    Patient: Rito Solano   MRN: [id number] : 1955        Procedure : Procedure(s):  HERNIORRHAPHY, INGUINAL, OPEN          Past Medical History:   Diagnosis Date     Irregular heart beat       Past Surgical History:   Procedure Laterality Date     PAROTIDECTOMY       WISDOM TOOTH EXTRACTION        No Known Allergies   Social History     Tobacco Use     Smoking status: Never Smoker     Smokeless tobacco: Never Used   Substance Use Topics     Alcohol use: Yes     Comment: 1-2 / week      Wt Readings from Last 1 Encounters:   22 83 kg (183 lb)        Anesthesia Evaluation   Pt has had prior anesthetic.     No history of anesthetic complications       ROS/MED HX  ENT/Pulmonary:       Neurologic:       Cardiovascular:     (+) -----Irregular Heartbeat/Palpitations,     METS/Exercise Tolerance:     Hematologic:       Musculoskeletal:       GI/Hepatic:       Renal/Genitourinary:       Endo:       Psychiatric/Substance Use:       Infectious Disease:       Malignancy:       Other:            Physical Exam    Airway        Mallampati: II    Neck ROM: full     Respiratory Devices and Support         Dental  no notable dental history         Cardiovascular   cardiovascular exam normal          Pulmonary   pulmonary exam normal                OUTSIDE LABS:  CBC:   Lab Results   Component Value Date    WBC 5.3 2022    HGB 13.9 2022    HCT 40.4 2022     (L) 2022     BMP:   Lab Results   Component Value Date     2022    POTASSIUM 4.2 2022    CHLORIDE 101 2022    CO2 31 2022    BUN 17 2022    CR 0.81 2022    GLC 96 2022     COAGS: No results found for: PTT, INR, FIBR  POC: No results found for: BGM, HCG, HCGS  HEPATIC:   Lab Results   Component Value Date    ALBUMIN 4.2 2022    PROTTOTAL 7.1 2022    ALT 19 2022    AST 20 2022    ALKPHOS 68 2022    BILITOTAL 0.6 2022      OTHER:   Lab Results   Component Value Date    OSMEL 9.8 03/28/2022    TSH 1.08 03/28/2022       Anesthesia Plan    ASA Status:  1      Anesthesia Type: MAC.              Consents    Anesthesia Plan(s) and associated risks, benefits, and realistic alternatives discussed. Questions answered and patient/representative(s) expressed understanding.     - Discussed: Risks, Benefits and Alternatives for the PROCEDURE were discussed     - Discussed with:  Patient      - Extended Intubation/Ventilatory Support Discussed: No.      - Patient is DNR/DNI Status: No    Use of blood products discussed: No .     Postoperative Care    Pain management: Multi-modal analgesia.        Comments:                Terrie Joiner MD

## 2022-07-12 NOTE — PROGRESS NOTES
I, the writer, have viewed a picture on the patient's phone showing a Negative COVID-19 result.    Srinivas Morrell RN on 7/12/2022 at 8:12 AM

## 2022-07-12 NOTE — OP NOTE
Name:  Rito Solano  PCP:  Adele Ramirez  Procedure Date:  7/12/2022      OPEN RIGHT INGUINAL HERNIA REPAIR WITH MESH      Pre-Procedure Diagnosis:  Right inguinal hernia    Post-Procedure Diagnosis:    Right indirect inguinal hernia    Anesthesia Type:  MAC    Estimated Blood Loss:   3 cc    Specimens:    None    Complications:    None apparent    Indication for procedure:  This is a 66-year-old male who noticed a bulge in the right inguinal region.  It is become more symptomatic over time.  He was diagnosed with an inguinal hernia.  He has elected for operative intervention for treatment.    Operative Narrative:    After informed consent was obtained, and the risks and benefits of the procedure were discussed, the patient was taken to the operating room and placed in a supine position.  Monitored anesthesia control was instituted by the anesthesia staff. Preoperative antibiotics were administered and a time-out was performed.  The patient was then prepped and draped in the usual sterile manner.  Local anesthetic of 1% lidocaine and a quarter percent Marcaine was infiltrated into the right inguinal region.  Incision made in the right inguinal region.  This was taken down through Jadon's fascia to the external oblique fascia.  The external oblique aponeurosis was incised in the direction of its fibers and lengthened to the external ring.  Flaps were created in the external oblique aponeurosis.  The cord was circumscribed and isolated with a Penrose.  The ilioinguinal nerve was identified and sacrificed in order to help prevent chronic postoperative pain.  The cord structures were evaluated and an indirect hernia sac was identified.  The hernia sac was dissected away from the cord along with a cord lipoma.  These were reduced ligated at the internal ring and discarded.  A polypropylene mesh was obtained.  It was placed along the floor of the canal parallel to the inguinal ligament.  It wrapped around the cord  structures to re-create the internal ring.  The mesh was secured in place with 0 Ethibond to the pubic tubercle, conjoined tendon, and to the iliopubic tract.  The tails of the mesh were tucked under the external oblique fascia.  An ilioinguinal nerve block was performed.  The external oblique aponeurosis was then closed in a running fashion with 3-0 Vicryl.  Jadon's fascia was closed with a running 3-0 Vicryl.  The incision was closed with interrupted 4-0 Vicryl, followed by a running subcuticular 4-0 Monocryl.  Steri-Strips and sterile dressings are applied.      Disposition:  All sponge and needle counts were correct.  The patient tolerated the procedure well and was transferred to the postanesthesia care unit in stable condition.       Elyse Murrell DO  General Surgeon  [facility]  Surgery [address] [address]  Office: [phone]  Employed by - [facility]

## 2022-07-12 NOTE — ANESTHESIA CARE TRANSFER NOTE
Patient: Rito Solano    Procedure: Procedure(s):  HERNIORRHAPHY, INGUINAL, OPEN       Diagnosis: Non-recurrent unilateral inguinal hernia without obstruction or gangrene [K40.90]  Diagnosis Additional Information: No value filed.    Anesthesia Type:   MAC     Note:    Oropharynx: oropharynx clear of all foreign objects  Level of Consciousness: drowsy  Oxygen Supplementation: face mask  Level of Supplemental Oxygen (L/min / FiO2): 8  Independent Airway: airway patency satisfactory and stable  Dentition: dentition unchanged  Vital Signs Stable: post-procedure vital signs reviewed and stable  Report to RN Given: handoff report given  Patient transferred to: Phase II    Handoff Report: Identifed the Patient, Identified the Reponsible Provider, Reviewed the pertinent medical history, Discussed the surgical course, Reviewed Intra-OP anesthesia mangement and issues during anesthesia, Set expectations for post-procedure period and Allowed opportunity for questions and acknowledgement of understanding      Vitals:  Vitals Value Taken Time   /82    Temp 97.9    Pulse 67    Resp 14    SpO2 99        Electronically Signed By: ETIENNE Salinas CRNA  July 12, 2022  10:34 AM

## 2022-07-27 ENCOUNTER — OFFICE VISIT (OUTPATIENT)
Dept: SURGERY | Facility: CLINIC | Age: 67
End: 2022-07-27
Payer: COMMERCIAL

## 2022-07-27 VITALS — DIASTOLIC BLOOD PRESSURE: 64 MMHG | SYSTOLIC BLOOD PRESSURE: 128 MMHG

## 2022-07-27 DIAGNOSIS — Z48.89 ENCOUNTER FOR POSTOPERATIVE CARE: Primary | ICD-10-CM

## 2022-07-27 PROCEDURE — 99024 POSTOP FOLLOW-UP VISIT: CPT | Performed by: PHYSICIAN ASSISTANT

## 2022-07-27 NOTE — PROGRESS NOTES
HPI: Pt is here for follow up of a open right inguinal hernia repair with Dr Murrell 7/12/22.   he is doing well.  Pain is well controlled.  No difficulties with the surgical wound/wounds.  he is eating well and denies fever and chills.         There were no vitals taken for this visit.    EXAM:  GENERAL:Appears well  ABDOMEN:  Soft, +BS  SURGICAL WOUNDS:  Incisions healing well, no enduration or drainage.      Assessment/Plan: . Doing well after surgery and should follow up as needed.    Aleisha Jama MPA-CODY

## 2022-07-27 NOTE — LETTER
7/27/2022         RE: Rito Solano  [address] [place] [address]        Dear Colleague,    Thank you for referring your patient, Rito Solano, to the [facility] [place]. Please see a copy of my visit note below.    HPI: Pt is here for follow up of a open right inguinal hernia repair with Dr Murrell 7/12/22.   he is doing well.  Pain is well controlled.  No difficulties with the surgical wound/wounds.  he is eating well and denies fever and chills.         There were no vitals taken for this visit.    EXAM:  GENERAL:Appears well  ABDOMEN:  Soft, +BS  SURGICAL WOUNDS:  Incisions healing well, no enduration or drainage.      Assessment/Plan: . Doing well after surgery and should follow up as needed.    Aleisha AMADOR              Again, thank you for allowing me to participate in the care of your patient.        Sincerely,        Aleisha Jama PA-C

## 2022-08-18 NOTE — PROGRESS NOTES
"    Assessment & Plan     Bilateral hip pain    - XR Pelvis and Hip Bilateral 2 Views  - XR Pelvis 1/2 Views  - Orthopedic  Referral    Pain is consistent with osteoarthritis of hips, rather than groin pull or sciatica.        39 minutes spent on the date of the encounter doing chart review, interpretation of tests and patient visit regarding hip pain.           Return in about 1 year (around 10/1/2022) for Routine preventive.    Ben Hathaway MD, MD  [facility] SOBIA Veras is a 65 year old who presents for the following health issues     HPI     Left greater than right hip pain, especially during the past year.  Increasing.  Only able to walk a half block.  He had previously pulled a groin muscle on the left, and wonders if that is the cause.    He played a lot of volleyball in the past (jumping, high impact).    No current outpatient medications on file.         Review of Systems   No fever or cough.  No incontinence of bowel or bladder.  No calf claudication.      Objective    /76   Pulse 91   Temp 98.1  F (36.7  C) (Oral)   Resp 17   Ht 1.88 m (6' 2\")   Wt 83.8 kg (184 lb 12 oz)   SpO2 96%   BMI 23.72 kg/m    Body mass index is 23.72 kg/m .  Physical Exam   Heart normal  Lungs normal  Back normal.  Straight leg raise normal.  Hips: pain with internal and external rotation, bilateral.  Not tender over greater trochanter  No pain of medial thigh muscles against resistance to adduction.  Legs: normal strength and sensation.    X-ray of pelvis/hips: moderate joint space narrowing            " Attempted to contact patient to inform of overdue mammo via  (ID # [id number]). Unable to LVM; voicemail service not available.

## 2022-10-01 ENCOUNTER — HEALTH MAINTENANCE LETTER (OUTPATIENT)
Age: 67
End: 2022-10-01

## 2023-02-05 ENCOUNTER — HEALTH MAINTENANCE LETTER (OUTPATIENT)
Age: 68
End: 2023-02-05

## 2023-07-14 ENCOUNTER — HOSPITAL ENCOUNTER (EMERGENCY)
Facility: CLINIC | Age: 68
Discharge: HOME | End: 2023-07-14
Attending: EMERGENCY MEDICINE | Admitting: EMERGENCY MEDICINE
Payer: MEDICARE

## 2023-07-14 ENCOUNTER — OFFICE VISIT (OUTPATIENT)
Dept: FAMILY MEDICINE | Facility: CLINIC | Age: 68
End: 2023-07-14
Payer: MEDICARE

## 2023-07-14 ENCOUNTER — APPOINTMENT (OUTPATIENT)
Dept: RADIOLOGY | Facility: CLINIC | Age: 68
End: 2023-07-14
Attending: EMERGENCY MEDICINE
Payer: MEDICARE

## 2023-07-14 VITALS
HEART RATE: 73 BPM | BODY MASS INDEX: 23.75 KG/M2 | SYSTOLIC BLOOD PRESSURE: 144 MMHG | RESPIRATION RATE: 14 BRPM | TEMPERATURE: 97.5 F | WEIGHT: 185 LBS | DIASTOLIC BLOOD PRESSURE: 75 MMHG | OXYGEN SATURATION: 98 %

## 2023-07-14 VITALS
DIASTOLIC BLOOD PRESSURE: 87 MMHG | SYSTOLIC BLOOD PRESSURE: 145 MMHG | WEIGHT: 185 LBS | HEART RATE: 71 BPM | TEMPERATURE: 98.2 F | HEIGHT: 74 IN | OXYGEN SATURATION: 98 % | BODY MASS INDEX: 23.74 KG/M2 | RESPIRATION RATE: 16 BRPM

## 2023-07-14 DIAGNOSIS — M89.8X1 PAIN OF LEFT SCAPULA: ICD-10-CM

## 2023-07-14 DIAGNOSIS — R07.9 CHEST PAIN, UNSPECIFIED TYPE: Primary | ICD-10-CM

## 2023-07-14 DIAGNOSIS — R07.89 CHEST DISCOMFORT: Primary | ICD-10-CM

## 2023-07-14 LAB
ALBUMIN SERPL-MCNC: 4.5 G/DL (ref 3.5–5)
ALP SERPL-CCNC: 59 U/L (ref 45–120)
ALT SERPL W P-5'-P-CCNC: 17 U/L (ref 0–45)
ANION GAP SERPL CALCULATED.3IONS-SCNC: 7 MMOL/L (ref 5–18)
AST SERPL W P-5'-P-CCNC: 26 U/L (ref 0–40)
ATRIAL RATE - MUSE: 67 BPM
ATRIAL RATE - MUSE: 68 BPM
BASOPHILS # BLD AUTO: 0 10E3/UL (ref 0–0.2)
BASOPHILS NFR BLD AUTO: 1 %
BILIRUB DIRECT SERPL-MCNC: 0.2 MG/DL
BILIRUB SERPL-MCNC: 0.6 MG/DL (ref 0–1)
BUN SERPL-MCNC: 24 MG/DL (ref 8–22)
CALCIUM SERPL-MCNC: 9.4 MG/DL (ref 8.5–10.5)
CHLORIDE BLD-SCNC: 104 MMOL/L (ref 98–107)
CO2 SERPL-SCNC: 29 MMOL/L (ref 22–31)
CREAT SERPL-MCNC: 0.8 MG/DL (ref 0.7–1.3)
D DIMER PPP FEU-MCNC: 0.28 UG/ML FEU (ref 0–0.5)
DIASTOLIC BLOOD PRESSURE - MUSE: NORMAL MMHG
DIASTOLIC BLOOD PRESSURE - MUSE: NORMAL MMHG
EOSINOPHIL # BLD AUTO: 0.2 10E3/UL (ref 0–0.7)
EOSINOPHIL NFR BLD AUTO: 3 %
ERYTHROCYTE [DISTWIDTH] IN BLOOD BY AUTOMATED COUNT: 12.2 % (ref 10–15)
GFR SERPL CREATININE-BSD FRML MDRD: >90 ML/MIN/1.73M2
GLUCOSE BLD-MCNC: 105 MG/DL (ref 70–125)
HCT VFR BLD AUTO: 40.2 % (ref 40–53)
HGB BLD-MCNC: 13.7 G/DL (ref 13.3–17.7)
IMM GRANULOCYTES # BLD: 0 10E3/UL
IMM GRANULOCYTES NFR BLD: 0 %
INTERPRETATION ECG - MUSE: NORMAL
INTERPRETATION ECG - MUSE: NORMAL
LIPASE SERPL-CCNC: 22 U/L (ref 0–52)
LYMPHOCYTES # BLD AUTO: 1.4 10E3/UL (ref 0.8–5.3)
LYMPHOCYTES NFR BLD AUTO: 23 %
MCH RBC QN AUTO: 30.4 PG (ref 26.5–33)
MCHC RBC AUTO-ENTMCNC: 34.1 G/DL (ref 31.5–36.5)
MCV RBC AUTO: 89 FL (ref 78–100)
MONOCYTES # BLD AUTO: 0.5 10E3/UL (ref 0–1.3)
MONOCYTES NFR BLD AUTO: 8 %
NEUTROPHILS # BLD AUTO: 4.1 10E3/UL (ref 1.6–8.3)
NEUTROPHILS NFR BLD AUTO: 65 %
NRBC # BLD AUTO: 0 10E3/UL
NRBC BLD AUTO-RTO: 0 /100
P AXIS - MUSE: 77 DEGREES
P AXIS - MUSE: 81 DEGREES
PLATELET # BLD AUTO: 147 10E3/UL (ref 150–450)
POTASSIUM BLD-SCNC: 4.4 MMOL/L (ref 3.5–5)
PR INTERVAL - MUSE: 174 MS
PR INTERVAL - MUSE: 178 MS
PROT SERPL-MCNC: 7.2 G/DL (ref 6–8)
QRS DURATION - MUSE: 104 MS
QRS DURATION - MUSE: 98 MS
QT - MUSE: 402 MS
QT - MUSE: 404 MS
QTC - MUSE: 426 MS
QTC - MUSE: 427 MS
R AXIS - MUSE: -36 DEGREES
R AXIS - MUSE: -46 DEGREES
RBC # BLD AUTO: 4.51 10E6/UL (ref 4.4–5.9)
SODIUM SERPL-SCNC: 140 MMOL/L (ref 136–145)
SYSTOLIC BLOOD PRESSURE - MUSE: NORMAL MMHG
SYSTOLIC BLOOD PRESSURE - MUSE: NORMAL MMHG
T AXIS - MUSE: 67 DEGREES
T AXIS - MUSE: 67 DEGREES
TROPONIN I SERPL-MCNC: <0.01 NG/ML (ref 0–0.29)
VENTRICULAR RATE- MUSE: 67 BPM
VENTRICULAR RATE- MUSE: 68 BPM
WBC # BLD AUTO: 6.2 10E3/UL (ref 4–11)

## 2023-07-14 PROCEDURE — 82248 BILIRUBIN DIRECT: CPT | Performed by: EMERGENCY MEDICINE

## 2023-07-14 PROCEDURE — 84484 ASSAY OF TROPONIN QUANT: CPT | Performed by: EMERGENCY MEDICINE

## 2023-07-14 PROCEDURE — 93010 ELECTROCARDIOGRAM REPORT: CPT | Mod: OFF | Performed by: INTERNAL MEDICINE

## 2023-07-14 PROCEDURE — 85379 FIBRIN DEGRADATION QUANT: CPT | Performed by: EMERGENCY MEDICINE

## 2023-07-14 PROCEDURE — 82310 ASSAY OF CALCIUM: CPT | Performed by: EMERGENCY MEDICINE

## 2023-07-14 PROCEDURE — 99214 OFFICE O/P EST MOD 30 MIN: CPT | Mod: 25 | Performed by: PHYSICIAN ASSISTANT

## 2023-07-14 PROCEDURE — 36415 COLL VENOUS BLD VENIPUNCTURE: CPT | Performed by: EMERGENCY MEDICINE

## 2023-07-14 PROCEDURE — 93005 ELECTROCARDIOGRAM TRACING: CPT | Performed by: EMERGENCY MEDICINE

## 2023-07-14 PROCEDURE — 93005 ELECTROCARDIOGRAM TRACING: CPT | Performed by: PHYSICIAN ASSISTANT

## 2023-07-14 PROCEDURE — 85004 AUTOMATED DIFF WBC COUNT: CPT | Performed by: EMERGENCY MEDICINE

## 2023-07-14 PROCEDURE — 250N000013 HC RX MED GY IP 250 OP 250 PS 637: Performed by: EMERGENCY MEDICINE

## 2023-07-14 PROCEDURE — 83690 ASSAY OF LIPASE: CPT | Performed by: EMERGENCY MEDICINE

## 2023-07-14 PROCEDURE — 99285 EMERGENCY DEPT VISIT HI MDM: CPT | Mod: 25

## 2023-07-14 PROCEDURE — 71046 X-RAY EXAM CHEST 2 VIEWS: CPT

## 2023-07-14 RX ORDER — ASPIRIN 81 MG/1
324 TABLET, CHEWABLE ORAL ONCE
Status: COMPLETED | OUTPATIENT
Start: 2023-07-14 | End: 2023-07-14

## 2023-07-14 RX ADMIN — ASPIRIN 81 MG CHEWABLE TABLET 324 MG: 81 TABLET CHEWABLE at 13:57

## 2023-07-14 ASSESSMENT — ACTIVITIES OF DAILY LIVING (ADL): ADLS_ACUITY_SCORE: 35

## 2023-07-14 NOTE — ED NOTES
Expected Patient Referral to ED  12:08 PM    Referring Clinic/Provider:  [facility]    Reason for referral/Clinical facts:  66 y/o m healthy  2wk cp, burning to L arm/scap  Intermittent  Assoc sob/lightheaded  Older bro w cabg age 63    Recommendations provided:  Send to ED for further evaluation    Caller was informed that this institution does possess the capabilities and/or resources to provide for patient and should be transferred to our facility.    Discussed that if direct admit is sought and any hurdles are encountered, this ED would be happy to see the patient and evaluate.    Informed caller that recommendations provided are recommendations based only on the facts provided and that they responsible to accept or reject the advice, or to seek a formal in person consultation as needed and that this ED will see/treat patient should they arrive.      Jailyn Rubio MD  [facility] EMERGENCY ROOM  [address] [facility] [address] [phone]       Jailyn Rubio MD  07/14/23 5850

## 2023-07-14 NOTE — ED TRIAGE NOTES
Patient presents to the ED with complaints of chest pain that has been intermittent for past 2 weeks. He reports lying down helps, also reports left shoulder.

## 2023-07-14 NOTE — ED PROVIDER NOTES
EMERGENCY DEPARTMENT ENCOUNTER      NAME: Rito Solano  AGE: 67 year old male  YOB: 1955  MRN: [id number]  EVALUATION DATE & TIME: No admission date for patient encounter.    PCP: System, Provider Not In    ED PROVIDER: Jailyn Rubio MD    Chief Complaint   Patient presents with     Chest Pain         FINAL IMPRESSION:  1. Chest pain, unspecified type    2. Pain of left scapula          ED COURSE & MEDICAL DECISION MAKING:    Pertinent Labs & Imaging studies reviewed. (See chart for details)  67 year old male with history of otherwise healthy but with significant family history of CAD who presents to the Emergency Department for evaluation of chest pain.  Patient describes 2 separate pains.  The first is which is left scapular sharp and is very much made worse with both palpation, and with movement of the shoulder.  Patient himself describes the pain being worse when he checks his blind spot or looks around with his neck and this is clearly muscular and does not warrant any further work-up.  The second pain is a substernal burning pain that that sometimes might be exertional with some associated shortness of breath.  Again does have strong family history of CAD and my primary concern is for ACS.  Differential includes GERD or pain from hepatobiliary pathology.  No severe pain radiating to the back or significant hypertension to suspect dissection.      Patient initially seen evaluated by myself in triage area due to boarding crisis.  Twelve-lead EKG shows sinus rhythm without ischemic changes.  Patient given aspirin.  Still has the sharp muscular back pain but does not have any of the burning sternal chest pain that brought him in.  CBC, BMP, LFTs, lipase, D-dimer and troponin negative.  Given 2 weeks of symptoms I do not think patient warrants serial cardiac enzymes here.  Chest x-ray pending at the time of dictation.  Signed out to oncoming physician awaiting results of x-ray for plan for discharge  to home with rapid access follow-up.  Did discuss admission with patient for stress testing, but he much prefers discharged home which I think is reasonable given his negative ED work-up otherwise.          ED Course as of 07/14/23 1419   Fri Jul 14, 2023   1349 D-Dimer Quantitative: 0.28       Medical Decision Making    History:    Supplemental history from: Documented in chart, if applicable  External Record(s) reviewed: Urgent care note from today, previous EKG  Work Up:    Chart documentation includes differential considered and any EKGs or imaging independently interpreted by provider, see MDM    In additional to work up documented, I considered the following work up: See MDM    External consultation:    Discussion of management with another provider: N/A    Complicating factors:    Care impacted by chronic illness: N/A    Care affected by social determinants of health: Access to Medical Care referred to ED    Disposition considerations: Discharge. No recommendations on prescription strength medication(s). I considered admission, but discharged the patient after share decision making conversation.        At the conclusion of the encounter I discussed the results of all of the tests and the disposition. The questions were answered. The patient or family acknowledged understanding and was agreeable with the care plan.    MEDICATIONS GIVEN IN THE EMERGENCY:  Medications   aspirin (ASA) chewable tablet 324 mg (324 mg Oral $Given 7/14/23 1357)       NEW PRESCRIPTIONS STARTED AT TODAY'S ER VISIT  New Prescriptions    No medications on file          =================================================================    HPI    Patient information was obtained from: Patient     Use of Intrepreter: N/A        Rito Solano is a 67 year old male with pertinent medical history of irregular heart beat, inguinal hernia, who presents via walk in for evaluation of chest pain.     The patient presents with chest pain that has been  "intermittent for the past 2 weeks. The pain is located to the left chest. He describe the pain as a burning sensation which last for a few seconds. The pain radiates to left shoulder, left arm, and left shoulder blade. He notes associated shortness of breath. The shoulder pain and shoulder blade pain is worsen with movement. He has reproducible pain when turning head to look at blind spot while driving. About 2 days ago, he was doing a floor project and stopped because he started having chest pain, shortness of breath, and lightheadedness. Denies any other medical concerns.     Family history: The patient reports having 2 brother who both had triple bypass.       PAST MEDICAL HISTORY:  Past Medical History:   Diagnosis Date     Irregular heart beat        PAST SURGICAL HISTORY:  Past Surgical History:   Procedure Laterality Date     HERNIORRHAPHY INGUINAL Right 7/12/2022    Procedure: HERNIORRHAPHY, INGUINAL, OPEN;  Surgeon: Elyse Murrell DO;  Location: [facility]     PAROTIDECTOMY       WISDOM TOOTH EXTRACTION         CURRENT MEDICATIONS:    None       ALLERGIES:  No Known Allergies    FAMILY HISTORY:  Family History   Problem Relation Age of Onset     Diabetes Father      Coronary Artery Disease Brother      Heart Disease Brother      Coronary Artery Disease Brother      Heart Disease Brother        SOCIAL HISTORY:  Social History     Tobacco Use     Smoking status: Never     Smokeless tobacco: Never   Substance Use Topics     Alcohol use: Yes     Comment: 1-2 / week     Drug use: No        VITALS:  Patient Vitals for the past 24 hrs:   BP Temp Temp src Pulse Resp SpO2 Height Weight   07/14/23 1227 (!) 145/87 98.2  F (36.8  C) Oral 71 18 98 % 1.88 m (6' 2\") 83.9 kg (185 lb)       PHYSICAL EXAM    General Appearance: Well-appearing, well-nourished, no acute distress   Head:  Normocephalic, atraumatic  Eyes:  PERRL, conjunctiva/corneas clear, EOM's intact  ENT:  Lips, mucosa, and tongue normal; membranes " are moist without pallor  Neck:  Supple, symmetrical, trachea midline, no adenopathy  Chest:  No tenderness or deformity, no crepitus  Cardio:  Regular rate and rhythm, no murmur/gallop/rub, 2+ pulses symmetric in all extremities  Pulm:  No respiratory distress, clear to auscultation bilaterally  Back: Reproducible tenderness to palpation of the left medical scapula and shoulder region, reproducible pain to range of motion.  No midline tenderness to palpation, no paraspinal tenderness, No CVA tenderness, normal ROM  Abdomen:  Soft, non-tender, non distended,no rebound or guarding.  Extremities:  Extremities normal, atraumatic, no cyanosis or edema, full ROM and motor tone intact, bilateral pulses intact upper and lower  Skin:  Skin warm, dry, no rashes  Neuro:  Alert and oriented ×3, moving all extremities, no gross sensory defects     RADIOLOGY/LABS:  Reviewed all pertinent imaging. Please see official radiology report. All pertinent labs reviewed and interpreted.    Results for orders placed or performed during the hospital encounter of 07/14/23   Basic metabolic panel   Result Value Ref Range    Sodium 140 136 - 145 mmol/L    Potassium 4.4 3.5 - 5.0 mmol/L    Chloride 104 98 - 107 mmol/L    Carbon Dioxide (CO2) 29 22 - 31 mmol/L    Anion Gap 7 5 - 18 mmol/L    Urea Nitrogen 24 (H) 8 - 22 mg/dL    Creatinine 0.80 0.70 - 1.30 mg/dL    Calcium 9.4 8.5 - 10.5 mg/dL    Glucose 105 70 - 125 mg/dL    GFR Estimate >90 >60 mL/min/1.73m2   Result Value Ref Range    Troponin I <0.01 0.00 - 0.29 ng/mL   D dimer quantitative   Result Value Ref Range    D-Dimer Quantitative 0.28 0.00 - 0.50 ug/mL FEU   Hepatic function panel   Result Value Ref Range    Bilirubin Total 0.6 0.0 - 1.0 mg/dL    Bilirubin Direct 0.2 <=0.5 mg/dL    Protein Total 7.2 6.0 - 8.0 g/dL    Albumin 4.5 3.5 - 5.0 g/dL    Alkaline Phosphatase 59 45 - 120 U/L    AST 26 0 - 40 U/L    ALT 17 0 - 45 U/L   Result Value Ref Range    Lipase 22 0 - 52 U/L   CBC with  platelets and differential   Result Value Ref Range    WBC Count 6.2 4.0 - 11.0 10e3/uL    RBC Count 4.51 4.40 - 5.90 10e6/uL    Hemoglobin 13.7 13.3 - 17.7 g/dL    Hematocrit 40.2 40.0 - 53.0 %    MCV 89 78 - 100 fL    MCH 30.4 26.5 - 33.0 pg    MCHC 34.1 31.5 - 36.5 g/dL    RDW 12.2 10.0 - 15.0 %    Platelet Count 147 (L) 150 - 450 10e3/uL    % Neutrophils 65 %    % Lymphocytes 23 %    % Monocytes 8 %    % Eosinophils 3 %    % Basophils 1 %    % Immature Granulocytes 0 %    NRBCs per 100 WBC 0 <1 /100    Absolute Neutrophils 4.1 1.6 - 8.3 10e3/uL    Absolute Lymphocytes 1.4 0.8 - 5.3 10e3/uL    Absolute Monocytes 0.5 0.0 - 1.3 10e3/uL    Absolute Eosinophils 0.2 0.0 - 0.7 10e3/uL    Absolute Basophils 0.0 0.0 - 0.2 10e3/uL    Absolute Immature Granulocytes 0.0 <=0.4 10e3/uL    Absolute NRBCs 0.0 10e3/uL   ECG 12-LEAD WITH MUSE (LHE)   Result Value Ref Range    Systolic Blood Pressure  mmHg    Diastolic Blood Pressure  mmHg    Ventricular Rate 68 BPM    Atrial Rate 68 BPM    MD Interval 178 ms    QRS Duration 98 ms     ms    QTc 427 ms    P Axis 81 degrees    R AXIS -36 degrees    T Axis 67 degrees    Interpretation ECG       Sinus rhythm  Left axis deviation  Abnormal ECG  When compared with ECG of 14-JUL-2023 10:45,  No significant change was found  Confirmed by SEE ED PROVIDER NOTE FOR, ECG INTERPRETATION ([id number]),  JERRY MONROY ([id number]) on 7/14/2023 2:01:05 PM         EKG:  Performed at: 14-JUL-2023 12:32    Impression: Sinus rhythm. Left Axis deviation.     Rate: 68 BPM  Rhythm: Normal sinus  Axis: -36  MD Interval: 178 ms  QRS Interval: 98 ms  QTc Interval: 427 ms  ST Changes: None  Comparison: When compared with ECG of 14-JUL-2023 10:45 No significant change was found.   I have independently reviewed and interpreted the EKG(s) documented above.    The creation of this record is based on the scribe s observations of the work being performed by Jailyn Rubio MD and the provider s statements to them.  It was created on her behalf by Naty Gupta, a trained medical scribe. This document has been checked and approved by the attending provider.    Jailyn Rubio MD  Emergency Medicine  [facility] EMERGENCY ROOM  [address] [place] [id number]  [phone]  Dept: [phone]          Jailyn Rubio MD  07/14/23 0048

## 2023-07-14 NOTE — ED NOTES
EMERGENCY DEPARTMENT SIGN OUT NOTE        ED COURSE AND MEDICAL DECISION MAKING  Patient was signed out to me by Dr Jailyn Rubio at 3:08 PM    In brief, Rito Solano is a 67 year old male who initially presented for evaluation of chest pain. The patient presents with chest pain that has been intermittent for the past 2 weeks. The pain is located to the left chest. He describe the pain as a burning sensation which last for a few seconds. The pain radiates to left shoulder, left arm, and left shoulder blade. He notes associated shortness of breath.    At time of sign out, disposition was pending chest x-ray.    3:13 PM I updated the patient on imaging results and discussed discharge.  The patient's repeat troponin was not elevated.  I reviewed chest x-ray as well which did not show any acute abnormality.  I reviewed and interpreted both EKGs and it appears the only abnormality I can see on there was a inverted T wave in lead aVL which had not changed between the EKGs.  He was comfortable following up in the cardiology rapid access clinic which was arranged by Dr. Wright.  He already had their phone number and had called them.    FINAL IMPRESSION    1. Chest pain, unspecified type    2. Pain of left scapula        ED MEDS  Medications   aspirin (ASA) chewable tablet 324 mg (324 mg Oral $Given 7/14/23 7836)       LAB  Labs Ordered and Resulted from Time of ED Arrival to Time of ED Departure   BASIC METABOLIC PANEL - Abnormal       Result Value    Sodium 140      Potassium 4.4      Chloride 104      Carbon Dioxide (CO2) 29      Anion Gap 7      Urea Nitrogen 24 (*)     Creatinine 0.80      Calcium 9.4      Glucose 105      GFR Estimate >90     CBC WITH PLATELETS AND DIFFERENTIAL - Abnormal    WBC Count 6.2      RBC Count 4.51      Hemoglobin 13.7      Hematocrit 40.2      MCV 89      MCH 30.4      MCHC 34.1      RDW 12.2      Platelet Count 147 (*)     % Neutrophils 65      % Lymphocytes 23      % Monocytes 8      %  Eosinophils 3      % Basophils 1      % Immature Granulocytes 0      NRBCs per 100 WBC 0      Absolute Neutrophils 4.1      Absolute Lymphocytes 1.4      Absolute Monocytes 0.5      Absolute Eosinophils 0.2      Absolute Basophils 0.0      Absolute Immature Granulocytes 0.0      Absolute NRBCs 0.0     TROPONIN I - Normal    Troponin I <0.01     D DIMER QUANTITATIVE - Normal    D-Dimer Quantitative 0.28     HEPATIC FUNCTION PANEL - Normal    Bilirubin Total 0.6      Bilirubin Direct 0.2      Protein Total 7.2      Albumin 4.5      Alkaline Phosphatase 59      AST 26      ALT 17     LIPASE - Normal    Lipase 22           RADIOLOGY    XR Chest 2 Views   Final Result   IMPRESSION: Lungs are clear. No pleural effusion. Heart size and pulmonary vascularity within normal limits.          DISCHARGE MEDS  New Prescriptions    No medications on file       Shaheen Braun MD  [facility] EMERGENCY ROOM  [address] [facility] [id number] [phone]     Shaheen Braun MD  07/14/23 8490

## 2023-07-14 NOTE — DISCHARGE INSTRUCTIONS
Your EKG and blood work today are reassuring.  Follow-up with rapid access clinic for stress testing as discussed.  Call to schedule outpatient stress test.  Return to the ER in the interim for the warning signs discussed.

## 2023-07-14 NOTE — PROGRESS NOTES
"Assessment & Plan     Chest discomfort  To ED  further evaluation to exclude ACS/Unstable angina    Discussed with EDMD.      - EKG 12-lead, tracing only     30 min spent in chart review, evaluation / pt visit, discussion with MD in ED, and documentation.      Brissa Aviles PA-C  [facility] Veras is a 67 year old male who presents to clinic today for the following health issues:  Chief Complaint   Patient presents with     Heartburn     Has heartburn comes and goes for 2 weeks with some left shoulder pain  worse with movement of neck took Maalox once and gets some SOB     HPI    2 weeks ago started. Burning in the chest \"once per hour\" sometimes more.  Currently has discomfort in the exam room.   Lasts minutes and goes away without intervention. Does have associated Sob/L arm discomfort/lightheaded with it.  waxining and waning x 2 weeks, occurs once per hour approximately. Heat makes discomfort worse.  Sharp pain in the L shoulder blade, lifting makes it worse and neck twiting makes worse.    \"heart burn\" comes and goes. Has never had a problem with dyspepsia in the past. Not worse in recumbent position.   Physical activity: working gardening, walking large yard does not necessarily provoke sx.   Sob with stairs but not burning in the chest.    Does not wake at night.    Can occur with rest. Not nessarily worse with exertion but has done little exertion.     Comes in today because pain was worse 2 days ago when it was particularly hot out, concerned it is related to his heart.      FH:     2 older brothers with cardiac problems: quadruple bipass and triple bipass respectively both at age 63, otherwise healthy, lean brothers without risk factors.        Review of Systems  Constitutional, HEENT, cardiovascular, pulmonary, gi and gu systems are negative, except as otherwise noted.      Objective    BP (!) 144/75   Pulse 73   Temp 97.5  F (36.4  C) (Oral)   Resp " 14   Wt 83.9 kg (185 lb)   SpO2 98%   BMI 23.75 kg/m    Physical Exam   nad appears well  Able to talk in full sentences.   Lungs CTA  RRR       Results for orders placed or performed in visit on 07/14/23   EKG 12-lead, tracing only     Status: None (Preliminary result)   Result Value Ref Range    Systolic Blood Pressure  mmHg    Diastolic Blood Pressure  mmHg    Ventricular Rate 67 BPM    Atrial Rate 67 BPM    NV Interval 174 ms    QRS Duration 104 ms     ms    QTc 426 ms    P Axis 77 degrees    R AXIS -46 degrees    T Axis 67 degrees    Interpretation ECG        Poor data quality, interpretation may be adversely affected  Sinus rhythm  Low voltage QRS  Left anterior fascicular block  Abnormal ECG  No previous ECGs available

## 2023-07-28 ENCOUNTER — OFFICE VISIT (OUTPATIENT)
Dept: CARDIOLOGY | Facility: CLINIC | Age: 68
End: 2023-07-28
Attending: EMERGENCY MEDICINE
Payer: MEDICARE

## 2023-07-28 VITALS
WEIGHT: 187 LBS | OXYGEN SATURATION: 97 % | SYSTOLIC BLOOD PRESSURE: 128 MMHG | BODY MASS INDEX: 24.01 KG/M2 | RESPIRATION RATE: 16 BRPM | HEART RATE: 80 BPM | DIASTOLIC BLOOD PRESSURE: 77 MMHG

## 2023-07-28 DIAGNOSIS — R07.9 CHEST PAIN, UNSPECIFIED TYPE: Primary | ICD-10-CM

## 2023-07-28 DIAGNOSIS — Z82.49 FAMILY HISTORY OF ISCHEMIC HEART DISEASE: ICD-10-CM

## 2023-07-28 PROCEDURE — 99204 OFFICE O/P NEW MOD 45 MIN: CPT | Performed by: INTERNAL MEDICINE

## 2023-07-28 NOTE — PROGRESS NOTES
Thank you, Dr. Jailyn Rubio, for asking the [facility] team to see Mr. Rito Solano to evaluate burning chest discomfort.    Assessment/Recommendations   Assessment:    1.  Intermittent burning chest discomfort, etiology unclear although it did appear to be precipitated or aggravated by physical activity.  He states the actual discomfort lasted only seconds and then resolved.  He did note radiation into the left shoulder and left arm which would be suspicious for cardiac etiology.  ECG and troponins in the ED unrevealing.  He does have a strong history of coronary artery disease with 2 older brothers having undergone bypass surgery at the age of 63.  We discussed options of stress testing versus CT coronary angiography.  He ultimately elected to pursue CT coronary angiography which I have ordered.  Further recommendations to follow.    Plan:  1.  Pursue CT coronary angiogram with further recommendations to follow  2.  Did advise patient to return to the ED if recurrent episodes of burning chest discomfort radiating to the left arm.       History of Present Illness    Mr. Rito Sloano is a 67 year old male with unremarkable past medical history with the exception of a family history of coronary artery disease and 2 brothers who underwent bypass surgery at the age of 63 who recently presented to the ED for evaluation of intermittent chest burning.  Patient states that he had been well prior to that day but began to note intermittent episodes of substernal burning feeling which would radiate up to his left shoulder and left arm and was associated with shortness of breath.  Symptoms would last for a number of seconds and then resolve only to recur again.  He did note some relation to physical activity but not necessarily with meals.  Ultimately came to the ED where EKG and troponin were normal.  Was advised to follow-up in rapid access clinic.  States the discomfort subsequently resolved after a day  or 2 and he has been symptom-free for the last 7 to 10 days.  Has had some limitation to his activity due to left hip pain so tells me it would be difficult to walk on a treadmill.    ECG (personally reviewed): ECG in the ED demonstrated normal sinus rhythm without acute ischemic changes    Cardiac Imaging Studies (personally reviewed): No prior cardiac imaging     Physical Examination Review of Systems   /77 (BP Location: Left arm, Patient Position: Sitting, Cuff Size: Adult Regular)   Pulse 80   Resp 16   Wt 84.8 kg (187 lb)   SpO2 97%   BMI 24.01 kg/m    Body mass index is 24.01 kg/m .  Wt Readings from Last 3 Encounters:   07/28/23 84.8 kg (187 lb)   07/14/23 83.9 kg (185 lb)   07/14/23 83.9 kg (185 lb)     General Appearance:   Awake, Alert, No acute distress.   HEENT:  No scleral icterus; the mucous membranes were pink and moist.   Neck: No cervical bruits or jugular venous distention    Chest: The spine was straight. The chest was symmetric.   Lungs:   Respirations unlabored; the lungs are clear to auscultation. No wheezing   Cardiovascular:   Regular rate and rhythm.  S1, S2 normal.  No murmur or gallop   Abdomen:  No organomegaly, masses, bruits, or tenderness. Bowels sounds are present   Extremities: No peripheral edema bilaterally   Skin: No xanthelasma. Warm, Dry.   Musculoskeletal: No tenderness.   Neurologic: Mood and affect are appropriate.    Enc Vitals  BP: 128/77  Pulse: 80  Resp: 16  SpO2: 97 %  Weight: 84.8 kg (187 lb)                                         Medical History  Surgical History Family History Social History   Past Medical History:   Diagnosis Date    Irregular heart beat     Past Surgical History:   Procedure Laterality Date    HERNIORRHAPHY INGUINAL Right 7/12/2022    Procedure: HERNIORRHAPHY, INGUINAL, OPEN;  Surgeon: Elyse Murrell DO;  Location: [facility]    PAROTIDECTOMY      WISDOM TOOTH EXTRACTION      Family History   Problem Relation Age of Onset     Diabetes Father     Coronary Artery Disease Brother 63        cabg    Heart Disease Brother     Coronary Artery Disease Brother 63        cabg    Heart Disease Brother     Social History     Socioeconomic History    Marital status:      Spouse name: Not on file    Number of children: Not on file    Years of education: Not on file    Highest education level: Not on file   Occupational History    Not on file   Tobacco Use    Smoking status: Never    Smokeless tobacco: Never   Substance and Sexual Activity    Alcohol use: Yes     Comment: 1-2 / week    Drug use: No    Sexual activity: Not on file   Other Topics Concern    Parent/sibling w/ CABG, MI or angioplasty before 65F 55M? No   Social History Narrative    Not on file     Social Determinants of Health     Financial Resource Strain: Not on file   Food Insecurity: Not on file   Transportation Needs: Not on file   Physical Activity: Not on file   Stress: Not on file   Social Connections: Not on file   Intimate Partner Violence: Not on file   Housing Stability: Not on file          Medications  Allergies   No current outpatient medications on file.      No Known Allergies      Lab Results    Chemistry/lipid CBC Cardiac Enzymes/BNP/TSH/INR   Recent Labs   Lab Test 07/14/23  1332   BUN 24*      CO2 29    Recent Labs   Lab Test 07/14/23  1332   WBC 6.2   HGB 13.7   HCT 40.2   MCV 89   *    Recent Labs   Lab Test 07/14/23  1332 03/28/22  1436   TROPONINI <0.01  --    TSH  --  1.08        A total of 35 minutes was spent reviewing patient's medical records, obtaining history and performing examination, as well as discussing diagnoses/ recommendations with patient and answering all questions.

## 2023-07-28 NOTE — LETTER
7/28/2023    Provider Not In System       RE: Rito Solano       Dear Colleague,     I had the pleasure of seeing Rito Solano in the [facility].      Thank you, Dr. Jailyn Rubio, for asking the [facility] team to see Mr. Rito Solano to evaluate burning chest discomfort.    Assessment/Recommendations   Assessment:    1.  Intermittent burning chest discomfort, etiology unclear although it did appear to be precipitated or aggravated by physical activity.  He states the actual discomfort lasted only seconds and then resolved.  He did note radiation into the left shoulder and left arm which would be suspicious for cardiac etiology.  ECG and troponins in the ED unrevealing.  He does have a strong history of coronary artery disease with 2 older brothers having undergone bypass surgery at the age of 63.  We discussed options of stress testing versus CT coronary angiography.  He ultimately elected to pursue CT coronary angiography which I have ordered.  Further recommendations to follow.    Plan:  1.  Pursue CT coronary angiogram with further recommendations to follow  2.  Did advise patient to return to the ED if recurrent episodes of burning chest discomfort radiating to the left arm.       History of Present Illness    Mr. Rito Solano is a 67 year old male with unremarkable past medical history with the exception of a family history of coronary artery disease and 2 brothers who underwent bypass surgery at the age of 63 who recently presented to the ED for evaluation of intermittent chest burning.  Patient states that he had been well prior to that day but began to note intermittent episodes of substernal burning feeling which would radiate up to his left shoulder and left arm and was associated with shortness of breath.  Symptoms would last for a number of seconds and then resolve only to recur again.  He did note some relation to physical activity but not necessarily with meals.   Ultimately came to the ED where EKG and troponin were normal.  Was advised to follow-up in rapid access clinic.  States the discomfort subsequently resolved after a day or 2 and he has been symptom-free for the last 7 to 10 days.  Has had some limitation to his activity due to left hip pain so tells me it would be difficult to walk on a treadmill.    ECG (personally reviewed): ECG in the ED demonstrated normal sinus rhythm without acute ischemic changes    Cardiac Imaging Studies (personally reviewed): No prior cardiac imaging     Physical Examination Review of Systems   /77 (BP Location: Left arm, Patient Position: Sitting, Cuff Size: Adult Regular)   Pulse 80   Resp 16   Wt 84.8 kg (187 lb)   SpO2 97%   BMI 24.01 kg/m    Body mass index is 24.01 kg/m .  Wt Readings from Last 3 Encounters:   07/28/23 84.8 kg (187 lb)   07/14/23 83.9 kg (185 lb)   07/14/23 83.9 kg (185 lb)     General Appearance:   Awake, Alert, No acute distress.   HEENT:  No scleral icterus; the mucous membranes were pink and moist.   Neck: No cervical bruits or jugular venous distention    Chest: The spine was straight. The chest was symmetric.   Lungs:   Respirations unlabored; the lungs are clear to auscultation. No wheezing   Cardiovascular:   Regular rate and rhythm.  S1, S2 normal.  No murmur or gallop   Abdomen:  No organomegaly, masses, bruits, or tenderness. Bowels sounds are present   Extremities: No peripheral edema bilaterally   Skin: No xanthelasma. Warm, Dry.   Musculoskeletal: No tenderness.   Neurologic: Mood and affect are appropriate.    Enc Vitals  BP: 128/77  Pulse: 80  Resp: 16  SpO2: 97 %  Weight: 84.8 kg (187 lb)                                         Medical History  Surgical History Family History Social History   Past Medical History:   Diagnosis Date    Irregular heart beat     Past Surgical History:   Procedure Laterality Date    HERNIORRHAPHY INGUINAL Right 7/12/2022    Procedure: HERNIORRHAPHY, INGUINAL,  OPEN;  Surgeon: Elyse Murrell DO;  Location: [facility]    PAROTIDECTOMY      WISDOM TOOTH EXTRACTION      Family History   Problem Relation Age of Onset    Diabetes Father     Coronary Artery Disease Brother 63        cabg    Heart Disease Brother     Coronary Artery Disease Brother 63        cabg    Heart Disease Brother     Social History     Socioeconomic History    Marital status:      Spouse name: Not on file    Number of children: Not on file    Years of education: Not on file    Highest education level: Not on file   Occupational History    Not on file   Tobacco Use    Smoking status: Never    Smokeless tobacco: Never   Substance and Sexual Activity    Alcohol use: Yes     Comment: 1-2 / week    Drug use: No    Sexual activity: Not on file   Other Topics Concern    Parent/sibling w/ CABG, MI or angioplasty before 65F 55M? No   Social History Narrative    Not on file     Social Determinants of Health     Financial Resource Strain: Not on file   Food Insecurity: Not on file   Transportation Needs: Not on file   Physical Activity: Not on file   Stress: Not on file   Social Connections: Not on file   Intimate Partner Violence: Not on file   Housing Stability: Not on file          Medications  Allergies   No current outpatient medications on file.      No Known Allergies      Lab Results    Chemistry/lipid CBC Cardiac Enzymes/BNP/TSH/INR   Recent Labs   Lab Test 07/14/23  1332   BUN 24*      CO2 29    Recent Labs   Lab Test 07/14/23  1332   WBC 6.2   HGB 13.7   HCT 40.2   MCV 89   *    Recent Labs   Lab Test 07/14/23  1332 03/28/22  1436   TROPONINI <0.01  --    TSH  --  1.08        A total of 35 minutes was spent reviewing patient's medical records, obtaining history and performing examination, as well as discussing diagnoses/ recommendations with patient and answering all questions.                        Thank you for allowing me to participate in the care of your  patient.      Sincerely,     Alejandra Vazquez MD     [facility]  cc:   Jailyn Rubio MD  [address] [place] [address]

## 2023-09-14 ENCOUNTER — HOSPITAL ENCOUNTER (OUTPATIENT)
Dept: CT IMAGING | Facility: CLINIC | Age: 68
Discharge: HOME | End: 2023-09-14
Attending: INTERNAL MEDICINE | Admitting: INTERNAL MEDICINE
Payer: MEDICARE

## 2023-09-14 VITALS — DIASTOLIC BLOOD PRESSURE: 62 MMHG | SYSTOLIC BLOOD PRESSURE: 128 MMHG

## 2023-09-14 DIAGNOSIS — R07.9 CHEST PAIN, UNSPECIFIED TYPE: ICD-10-CM

## 2023-09-14 DIAGNOSIS — Z82.49 FAMILY HISTORY OF ISCHEMIC HEART DISEASE: ICD-10-CM

## 2023-09-14 DIAGNOSIS — R93.1 ABNORMAL FINDINGS ON DIAGNOSTIC IMAGING OF HEART AND CORONARY CIRCULATION: ICD-10-CM

## 2023-09-14 LAB
BSA FOR ECHO PROCEDURE: 2.11 M2
BSA FOR ECHO PROCEDURE: 2.11 M2
CCTA ASCENDING AORTA: 3.4
CCTA SINUS: 3.9
CREAT BLD-MCNC: 0.9 MG/DL (ref 0.7–1.3)
EGFRCR SERPLBLD CKD-EPI 2021: >60 ML/MIN/1.73M2

## 2023-09-14 PROCEDURE — 250N000009 HC RX 250: Performed by: INTERNAL MEDICINE

## 2023-09-14 PROCEDURE — 250N000013 HC RX MED GY IP 250 OP 250 PS 637: Performed by: INTERNAL MEDICINE

## 2023-09-14 PROCEDURE — G1010 CDSM STANSON: HCPCS | Performed by: GENERAL ACUTE CARE HOSPITAL

## 2023-09-14 PROCEDURE — 75574 CT ANGIO HRT W/3D IMAGE: CPT | Mod: 26 | Performed by: GENERAL ACUTE CARE HOSPITAL

## 2023-09-14 PROCEDURE — 0504T CT FFR: CPT | Performed by: GENERAL ACUTE CARE HOSPITAL

## 2023-09-14 PROCEDURE — 0503T CT FFR: CPT

## 2023-09-14 PROCEDURE — 82565 ASSAY OF CREATININE: CPT

## 2023-09-14 PROCEDURE — 75574 CT ANGIO HRT W/3D IMAGE: CPT | Mod: MG

## 2023-09-14 PROCEDURE — 250N000011 HC RX IP 250 OP 636: Mod: JZ | Performed by: INTERNAL MEDICINE

## 2023-09-14 RX ORDER — DILTIAZEM HYDROCHLORIDE 5 MG/ML
5 INJECTION INTRAVENOUS
Status: DISCONTINUED | OUTPATIENT
Start: 2023-09-14 | End: 2023-09-15 | Stop reason: HOSPADM

## 2023-09-14 RX ORDER — IOPAMIDOL 755 MG/ML
100 INJECTION, SOLUTION INTRAVASCULAR ONCE
Status: COMPLETED | OUTPATIENT
Start: 2023-09-14 | End: 2023-09-14

## 2023-09-14 RX ORDER — NITROGLYCERIN 0.4 MG/1
0.4 TABLET SUBLINGUAL ONCE
Status: COMPLETED | OUTPATIENT
Start: 2023-09-14 | End: 2023-09-14

## 2023-09-14 RX ORDER — DILTIAZEM HYDROCHLORIDE 5 MG/ML
10 INJECTION INTRAVENOUS
Status: DISCONTINUED | OUTPATIENT
Start: 2023-09-14 | End: 2023-09-15 | Stop reason: HOSPADM

## 2023-09-14 RX ORDER — METOPROLOL TARTRATE 1 MG/ML
5 INJECTION, SOLUTION INTRAVENOUS
Status: DISCONTINUED | OUTPATIENT
Start: 2023-09-14 | End: 2023-09-15 | Stop reason: HOSPADM

## 2023-09-14 RX ORDER — LIDOCAINE 40 MG/G
CREAM TOPICAL
Status: DISCONTINUED | OUTPATIENT
Start: 2023-09-14 | End: 2023-09-15 | Stop reason: HOSPADM

## 2023-09-14 RX ADMIN — METOPROLOL TARTRATE 5 MG: 1 INJECTION, SOLUTION INTRAVENOUS at 13:15

## 2023-09-14 RX ADMIN — METOPROLOL TARTRATE 5 MG: 1 INJECTION, SOLUTION INTRAVENOUS at 13:18

## 2023-09-14 RX ADMIN — NITROGLYCERIN 0.4 MG: 0.4 TABLET SUBLINGUAL at 13:21

## 2023-09-14 RX ADMIN — IOPAMIDOL 100 ML: 755 INJECTION, SOLUTION INTRAVENOUS at 13:27

## 2023-09-15 ENCOUNTER — APPOINTMENT (OUTPATIENT)
Dept: LAB | Facility: CLINIC | Age: 68
End: 2023-09-15
Payer: MEDICARE

## 2023-10-17 ENCOUNTER — MYC MEDICAL ADVICE (OUTPATIENT)
Dept: CARDIOLOGY | Facility: CLINIC | Age: 68
End: 2023-10-17
Payer: MEDICARE

## 2023-10-17 DIAGNOSIS — R93.1 ABNORMAL FINDINGS ON DIAGNOSTIC IMAGING OF HEART AND CORONARY CIRCULATION: Primary | ICD-10-CM

## 2023-10-17 DIAGNOSIS — Z82.49 FAMILY HISTORY OF ISCHEMIC HEART DISEASE: ICD-10-CM

## 2023-10-19 DIAGNOSIS — R93.1 ABNORMAL FINDINGS ON DIAGNOSTIC IMAGING OF HEART AND CORONARY CIRCULATION: ICD-10-CM

## 2023-10-19 DIAGNOSIS — R07.9 CHEST PAIN, UNSPECIFIED TYPE: Primary | ICD-10-CM

## 2023-10-20 NOTE — TELEPHONE ENCOUNTER
"Noted MyChart msg \"Last read by Rito Solano at  8:54 AM on 10/20/2023.\" - H&P sched on 10-26-23 - procedure on 10-27-23.  mg  "

## 2023-10-23 ENCOUNTER — PREP FOR PROCEDURE (OUTPATIENT)
Dept: CARDIOLOGY | Facility: CLINIC | Age: 68
End: 2023-10-23
Payer: MEDICARE

## 2023-10-23 ENCOUNTER — TELEPHONE (OUTPATIENT)
Dept: CARDIOLOGY | Facility: CLINIC | Age: 68
End: 2023-10-23
Payer: MEDICARE

## 2023-10-23 DIAGNOSIS — R93.1 ABNORMAL FINDINGS ON DIAGNOSTIC IMAGING OF HEART AND CORONARY CIRCULATION: ICD-10-CM

## 2023-10-23 DIAGNOSIS — R07.9 CHEST PAIN, UNSPECIFIED TYPE: Primary | ICD-10-CM

## 2023-10-23 RX ORDER — ASPIRIN 325 MG
325 TABLET ORAL ONCE
Status: CANCELLED | OUTPATIENT
Start: 2023-10-23 | End: 2023-10-23

## 2023-10-23 RX ORDER — ASPIRIN 81 MG/1
243 TABLET, CHEWABLE ORAL ONCE
Status: CANCELLED | OUTPATIENT
Start: 2023-10-23

## 2023-10-23 RX ORDER — FENTANYL CITRATE 50 UG/ML
25 INJECTION, SOLUTION INTRAMUSCULAR; INTRAVENOUS
Status: CANCELLED | OUTPATIENT
Start: 2023-10-23

## 2023-10-23 RX ORDER — LIDOCAINE 40 MG/G
CREAM TOPICAL
Status: CANCELLED | OUTPATIENT
Start: 2023-10-23

## 2023-10-23 RX ORDER — SODIUM CHLORIDE 9 MG/ML
INJECTION, SOLUTION INTRAVENOUS CONTINUOUS
Status: CANCELLED | OUTPATIENT
Start: 2023-10-23

## 2023-10-23 NOTE — TELEPHONE ENCOUNTER
Rito Solano  [address] [place] [address]  [phone] (home)     Procedure cardiologist:  Dr. Eng or Mohamud  PCP:  System, Provider Not In  H&P completed by:  10-26-23 Yolanda ENNIS PA-C  Admit date Friday 10-27-23  Arrival time:  0730  Anticoagulation:  NA  CPAP: No  Previous PCI: No  Bypass Grafts: No  Renal Issues: No  Diabetic?: No  Device?: No  Type:  NA  Ambulation status: independent    Reason for Visit:  Telephone call to discuss pre-procedure education in preparation for: Coronary Angiogram with Possible PCI    Procedure Prep:  Primary Cardiologist note dated: Dr. Angela 7-28-23  EKG results obtained, dated: To be completed on day of cath lab procedure  Hemogram results obtained: To be completed on day of cath lab procedure  Basic Metabolic Panel results obtained: To be completed on day of cath lab procedure  Lipid Profile results obtained: on admission  Pertinent cardiac test results: 9-14-23 corCTA  Orders placed per cosign required protocol 10-23-23.    COVID-19 test: NA    Patient Education  Your arrival time is 0730.  [place] [address] - Main Entrance of the Hospital  Please plan on being at the hospital all day.  At any time, emergencies and/or urgent cases may come up which could delay the start of your procedure.    COVID Testing Instructions  *Mandatory COVID Testing: ALL Patients will need to complete a COVID test no sooner than 4 days prior to their procedure (regardless of vaccination status).    To schedule COVID testing Please call [phone]  If you want to complete this at an outside facility please call them to find out if they will have the results within the appropriate time frame and their fax number.  You will need to provide us with that information so we can send the order.  The facility completing the test will need to fax the results to [phone]  If you are running into and issues please  call us.     Pre-procedure instructions  Patient instructed to not Eat or Drink after midnight.  Patient instructed to shower the evening before or the morning of the procedure.  Patient instructed to arrange for transportation home following procedure from a responsible family member of friend (wife Toñito will be ).  No driving for at least 24 hours.  Patient instructed to have a responsible adult with them for 24 hours post-procedure.  Post-procedure follow up process.  Conscious sedation discussed.  Patient instructed on antiplatelet medication.  Received procedure education handouts via [employer].     Pre-procedure medication instructions.  Continue medications as scheduled, with a small amount of water on the day of the procedure unless indicated. (NO Diabetic Medications or Blood Thinners)  Pt instructed not to consume Alcohol, Tobacco, Caffeine 12 hours prior to procedure.  Patient instructed to take 4-81 mg of Aspirin morning of procedure.    Patient states understanding of procedure and agrees to proceed.    *PATIENTS RECORDS AVAILABLE IN [facility] UNLESS OTHERWISE INDICATED*      Patient Active Problem List   Diagnosis    Non-recurrent unilateral inguinal hernia without obstruction or gangrene       No current outpatient medications on file.       No Known Allergies    Lara Olivia RN on 10/23/2023 at 8:26 AM

## 2023-10-23 NOTE — TELEPHONE ENCOUNTER
----- Message -----  From: Mary De La Torre  Sent: 10/19/2023   2:49 PM CDT  To: Lara Olivia RN  Subject: KML ORDERING                                     Case type: CORS POSS PCI     Procedure Physician(s): JEANIE/EARLE     Procedure Date and Patient Arrival Time: Friday 10/27, with arrival time of 730 AM    H&P: Pt scheduled on 10/26 WITH JOSE WARREN     Pre-Procedure Lab Appt: on admission    Alerts/Important Info: None    Janeth Saeed

## 2023-10-25 NOTE — H&P (VIEW-ONLY)
Assessment/Recommendations   Assessment/Plan:  1.  Coronary artery disease: CT coronary angiogram done on 9/14/23 showed mixed calcific and noncalcific plaque in the mid LAD causing moderate stenosis (50 to 69%) with a high likelihood of lesion specific ischemia with FFRct 0.77.     Currently on no medications.    Discussed about the indication for coronary angiogram/possible PCI and the risks associated with angiogram including <0.1% of MI and CVA or death or any of the following to the degree that it could threaten her life: allergic reaction, arrhythmia, renal failure, hemorrhage, thrombosis and infection.  Risk associated with therapeutic intervention includes 2% or less risk of MI, less than 1% risk of CVA, emergency her surgery, death, less than 4% risk of vascular injury requiring surgical repair or blood transfusion with 20-30% risk of restenosis with a bare-metal stent and less than 10% risk of stenosis with a drug-eluting stent.     Most recent BMP and CBC 7/14/23 were unremarkable.   Patient had phone call with Lara Olivia RN for pre-angio teach.     Plan:  - Lab work today- BMP, CBC, lipids, T&S  - Avoid strenuous exercise or lifting heavy objects until further direction  - Take 4, 81mg of Aspirin morning on procedure  - Please seek medical attention if persistent worsening chest pain/tightness/pressure, shortness of breath, lightheaded or dizziness  - Pre angio teach by Lara Olivia RN     History of Present Illness/Subjective    Rito Solano is a 67 year old years old  with a significant PMH of moderate CAD as evidenced on recent CT coronary angiogram is here at [facility] for pre procedure history and physical examination for coronary angiogram.  Patient noted he was previously having a burning sensation in his chest that was coming and going lasting 2 to 3 weeks on and off.  Did have some shortness of breath with exertion at that time as well.  Symptoms had  improved since before getting the CT coronary angiogram and have not returned since.    His recent CTA angiogram done on 9/14/23 showed mixed calcific and noncalcific plaque in the mid LAD causing moderate stenosis (50 to 69%) with a high likelihood of lesion specific ischemia with FFRct 0.77. Dr. Vazquez has recommended coronary angiogram with possible coronary intervention.     He denies fatigue, lightheadedness, shortness of breath, dyspnea on exertion, orthopnea, PND, palpitations, chest pain, abdominal fullness/bloating, and lower extremity edema.  He further denies fever, chills, N/V, diarrhea, vomiting, constipation, hematochezia, hematemesis, hemoptysis. He also denies malignant hyperthermia, stroke/TIA, bleeding or clotting disorders, COPD/Asthma/JOSÉ MIGUEL, anaphylaxis reaction to medications, IV contrast or sea food.         CTA angiogram 9/14/23 -reviewed    Mixed calcific and noncalcific plaque in the mid LAD causing moderate (50-69%) stenosis. High likelihood of lesion-specific ischemia with FFRct 0.77.    Otherwise nonobstructive coronary artery disease comprised of mixed calcific and noncalcific plaque with an overall moderate burden of atherosclerosis.    Radiology review for incidental non cardiac findings will be under separate report by the radiologist.    Holter Study on 4/11/22-reviewed:  Holter monitoring from 4/11/2022 to 4/12/2022 (duration 24hrs). Predominant underlying rhythm was sinus rhythm, 58 to 118bpm, average 82bpm No sustained tachyarrhythmias. No atrial fibrillation. There were no pauses of greater than 3 seconds. Rare supraventricular ectopic beats (<1%). Rare premature ventricular contractions (1%). Symptoms of palpitations correlated to sinus rhythm 76-110bpm. Impression Sinus rhythm without sustained tachyarrhythmia, frequent ectopy or significant bradyarrhythmia    EKG 7/14/23- reviewed:  Sinus rhythm  Left axis deviation  Abnormal ECG  When compared with ECG of 14-JUL-2023 10:45,  No  significant change was found  Confirmed by SEE ED PROVIDER NOTE FOR, ECG INTERPRETATION ([id number]),  MONROYJERRY JAIMES ([id number]) on 7/14/2023 2:01:05 PM   Rate: 68     Physical Examination Review of Systems   /60 (BP Location: Right arm, Patient Position: Sitting, Cuff Size: Adult Regular)   Pulse 81   Resp 20   Wt 82.1 kg (180 lb 14.4 oz)   SpO2 98%   BMI 23.23 kg/m    Body mass index is 23.23 kg/m .  Wt Readings from Last 3 Encounters:   07/28/23 84.8 kg (187 lb)   07/14/23 83.9 kg (185 lb)   07/14/23 83.9 kg (185 lb)     General Appearance:   no distress, normal body habitus   ENT/Mouth: membranes moist    EYES:  no scleral icterus, normal conjunctivae   Neck: no carotid bruits or thyromegaly   Chest/Lungs:   lungs are clear to auscultation, no rales or wheezing, equal chest wall expansion    Cardiovascular:   Regular. Normal first and second heart sounds with no murmurs, rubs, or gallops; the carotid, radial pulses are intact, Jugular venous pressure normal, no edema bilaterally    Abdomen:  no organomegaly, masses, bruits, or tenderness; bowel sounds are present   Extremities   no cyanosis or clubbing.  Radial pulses intact and symmetrical.  CMS intact.   Skin: no xanthelasma, warm.    Neurologic: normal  bilateral, no tremors     Psychiatric: alert and oriented x3, calm                                                        Negative unless noted in HPI     Medical History  Surgical History Family History Social History   Past Medical History:   Diagnosis Date    Irregular heart beat     Past Surgical History:   Procedure Laterality Date    HERNIORRHAPHY INGUINAL Right 7/12/2022    Procedure: HERNIORRHAPHY, INGUINAL, OPEN;  Surgeon: Elyse Murrell DO;  Location: [facility]    PAROTIDECTOMY      WISDOM TOOTH EXTRACTION      Family History   Problem Relation Age of Onset    Diabetes Father     Coronary Artery Disease Brother 63        cabg    Heart Disease Brother     Coronary Artery Disease  Brother 63        cabg    Heart Disease Brother     Social History     Socioeconomic History    Marital status:      Spouse name: Not on file    Number of children: Not on file    Years of education: Not on file    Highest education level: Not on file   Occupational History    Not on file   Tobacco Use    Smoking status: Never    Smokeless tobacco: Never   Substance and Sexual Activity    Alcohol use: Yes     Comment: 1-2 / week    Drug use: No    Sexual activity: Not on file   Other Topics Concern    Parent/sibling w/ CABG, MI or angioplasty before 65F 55M? No   Social History Narrative    Not on file     Social Determinants of Health     Financial Resource Strain: Not on file   Food Insecurity: Not on file   Transportation Needs: Not on file   Physical Activity: Not on file   Stress: Not on file   Social Connections: Not on file   Interpersonal Safety: Not on file   Housing Stability: Not on file          Medications  Allergies   No current outpatient medications on file.    No Known Allergies      Lab Results    Chemistry/lipid CBC Cardiac Enzymes/BNP/TSH/INR   Lab Results   Component Value Date    BUN 24 (H) 07/14/2023     07/14/2023    CO2 29 07/14/2023    Lab Results   Component Value Date    WBC 6.2 07/14/2023    HGB 13.7 07/14/2023    HCT 40.2 07/14/2023    MCV 89 07/14/2023     (L) 07/14/2023    Lab Results   Component Value Date    TROPONINI <0.01 07/14/2023    TSH 1.08 03/28/2022          This note has been dictated using voice recognition software. Any grammatical, typographical, or context distortions are unintentional and inherent to the software    Yolanda Prince PA-C

## 2023-10-25 NOTE — PROGRESS NOTES
Assessment/Recommendations   Assessment/Plan:  1.  Coronary artery disease: CT coronary angiogram done on 9/14/23 showed mixed calcific and noncalcific plaque in the mid LAD causing moderate stenosis (50 to 69%) with a high likelihood of lesion specific ischemia with FFRct 0.77.     Currently on no medications.    Discussed about the indication for coronary angiogram/possible PCI and the risks associated with angiogram including <0.1% of MI and CVA or death or any of the following to the degree that it could threaten her life: allergic reaction, arrhythmia, renal failure, hemorrhage, thrombosis and infection.  Risk associated with therapeutic intervention includes 2% or less risk of MI, less than 1% risk of CVA, emergency her surgery, death, less than 4% risk of vascular injury requiring surgical repair or blood transfusion with 20-30% risk of restenosis with a bare-metal stent and less than 10% risk of stenosis with a drug-eluting stent.     Most recent BMP and CBC 7/14/23 were unremarkable.   Patient had phone call with Lara Olivia RN for pre-angio teach.     Plan:  - Lab work today- BMP, CBC, lipids, T&S  - Avoid strenuous exercise or lifting heavy objects until further direction  - Take 4, 81mg of Aspirin morning on procedure  - Please seek medical attention if persistent worsening chest pain/tightness/pressure, shortness of breath, lightheaded or dizziness  - Pre angio teach by Lara Olivia RN     History of Present Illness/Subjective    Rito Solano is a 67 year old years old  with a significant PMH of moderate CAD as evidenced on recent CT coronary angiogram is here at [facility] for pre procedure history and physical examination for coronary angiogram.  Patient noted he was previously having a burning sensation in his chest that was coming and going lasting 2 to 3 weeks on and off.  Did have some shortness of breath with exertion at that time as well.  Symptoms had  improved since before getting the CT coronary angiogram and have not returned since.    His recent CTA angiogram done on 9/14/23 showed mixed calcific and noncalcific plaque in the mid LAD causing moderate stenosis (50 to 69%) with a high likelihood of lesion specific ischemia with FFRct 0.77. Dr. Vazquez has recommended coronary angiogram with possible coronary intervention.     He denies fatigue, lightheadedness, shortness of breath, dyspnea on exertion, orthopnea, PND, palpitations, chest pain, abdominal fullness/bloating, and lower extremity edema.  He further denies fever, chills, N/V, diarrhea, vomiting, constipation, hematochezia, hematemesis, hemoptysis. He also denies malignant hyperthermia, stroke/TIA, bleeding or clotting disorders, COPD/Asthma/JOSÉ MIGUEL, anaphylaxis reaction to medications, IV contrast or sea food.         CTA angiogram 9/14/23 -reviewed    Mixed calcific and noncalcific plaque in the mid LAD causing moderate (50-69%) stenosis. High likelihood of lesion-specific ischemia with FFRct 0.77.    Otherwise nonobstructive coronary artery disease comprised of mixed calcific and noncalcific plaque with an overall moderate burden of atherosclerosis.    Radiology review for incidental non cardiac findings will be under separate report by the radiologist.    Holter Study on 4/11/22-reviewed:  Holter monitoring from 4/11/2022 to 4/12/2022 (duration 24hrs). Predominant underlying rhythm was sinus rhythm, 58 to 118bpm, average 82bpm No sustained tachyarrhythmias. No atrial fibrillation. There were no pauses of greater than 3 seconds. Rare supraventricular ectopic beats (<1%). Rare premature ventricular contractions (1%). Symptoms of palpitations correlated to sinus rhythm 76-110bpm. Impression Sinus rhythm without sustained tachyarrhythmia, frequent ectopy or significant bradyarrhythmia    EKG 7/14/23- reviewed:  Sinus rhythm  Left axis deviation  Abnormal ECG  When compared with ECG of 14-JUL-2023 10:45,  No  significant change was found  Confirmed by SEE ED PROVIDER NOTE FOR, ECG INTERPRETATION ([id number]),  MONROYJERRY JAIMES ([id number]) on 7/14/2023 2:01:05 PM   Rate: 68     Physical Examination Review of Systems   /60 (BP Location: Right arm, Patient Position: Sitting, Cuff Size: Adult Regular)   Pulse 81   Resp 20   Wt 82.1 kg (180 lb 14.4 oz)   SpO2 98%   BMI 23.23 kg/m    Body mass index is 23.23 kg/m .  Wt Readings from Last 3 Encounters:   07/28/23 84.8 kg (187 lb)   07/14/23 83.9 kg (185 lb)   07/14/23 83.9 kg (185 lb)     General Appearance:   no distress, normal body habitus   ENT/Mouth: membranes moist    EYES:  no scleral icterus, normal conjunctivae   Neck: no carotid bruits or thyromegaly   Chest/Lungs:   lungs are clear to auscultation, no rales or wheezing, equal chest wall expansion    Cardiovascular:   Regular. Normal first and second heart sounds with no murmurs, rubs, or gallops; the carotid, radial pulses are intact, Jugular venous pressure normal, no edema bilaterally    Abdomen:  no organomegaly, masses, bruits, or tenderness; bowel sounds are present   Extremities   no cyanosis or clubbing.  Radial pulses intact and symmetrical.  CMS intact.   Skin: no xanthelasma, warm.    Neurologic: normal  bilateral, no tremors     Psychiatric: alert and oriented x3, calm                                                        Negative unless noted in HPI     Medical History  Surgical History Family History Social History   Past Medical History:   Diagnosis Date    Irregular heart beat     Past Surgical History:   Procedure Laterality Date    HERNIORRHAPHY INGUINAL Right 7/12/2022    Procedure: HERNIORRHAPHY, INGUINAL, OPEN;  Surgeon: Elyse Murrell DO;  Location: [facility]    PAROTIDECTOMY      WISDOM TOOTH EXTRACTION      Family History   Problem Relation Age of Onset    Diabetes Father     Coronary Artery Disease Brother 63        cabg    Heart Disease Brother     Coronary Artery Disease  Brother 63        cabg    Heart Disease Brother     Social History     Socioeconomic History    Marital status:      Spouse name: Not on file    Number of children: Not on file    Years of education: Not on file    Highest education level: Not on file   Occupational History    Not on file   Tobacco Use    Smoking status: Never    Smokeless tobacco: Never   Substance and Sexual Activity    Alcohol use: Yes     Comment: 1-2 / week    Drug use: No    Sexual activity: Not on file   Other Topics Concern    Parent/sibling w/ CABG, MI or angioplasty before 65F 55M? No   Social History Narrative    Not on file     Social Determinants of Health     Financial Resource Strain: Not on file   Food Insecurity: Not on file   Transportation Needs: Not on file   Physical Activity: Not on file   Stress: Not on file   Social Connections: Not on file   Interpersonal Safety: Not on file   Housing Stability: Not on file          Medications  Allergies   No current outpatient medications on file.    No Known Allergies      Lab Results    Chemistry/lipid CBC Cardiac Enzymes/BNP/TSH/INR   Lab Results   Component Value Date    BUN 24 (H) 07/14/2023     07/14/2023    CO2 29 07/14/2023    Lab Results   Component Value Date    WBC 6.2 07/14/2023    HGB 13.7 07/14/2023    HCT 40.2 07/14/2023    MCV 89 07/14/2023     (L) 07/14/2023    Lab Results   Component Value Date    TROPONINI <0.01 07/14/2023    TSH 1.08 03/28/2022          This note has been dictated using voice recognition software. Any grammatical, typographical, or context distortions are unintentional and inherent to the software    Yolanda Prince PA-C

## 2023-10-26 ENCOUNTER — OFFICE VISIT (OUTPATIENT)
Dept: CARDIOLOGY | Facility: CLINIC | Age: 68
End: 2023-10-26
Payer: MEDICARE

## 2023-10-26 VITALS
HEART RATE: 81 BPM | DIASTOLIC BLOOD PRESSURE: 60 MMHG | RESPIRATION RATE: 20 BRPM | OXYGEN SATURATION: 98 % | WEIGHT: 180.9 LBS | SYSTOLIC BLOOD PRESSURE: 132 MMHG | BODY MASS INDEX: 23.23 KG/M2

## 2023-10-26 DIAGNOSIS — Z82.49 FAMILY HISTORY OF ISCHEMIC HEART DISEASE: ICD-10-CM

## 2023-10-26 DIAGNOSIS — R93.1 ABNORMAL FINDINGS ON DIAGNOSTIC IMAGING OF HEART AND CORONARY CIRCULATION: ICD-10-CM

## 2023-10-26 PROCEDURE — 99214 OFFICE O/P EST MOD 30 MIN: CPT | Performed by: STUDENT IN AN ORGANIZED HEALTH CARE EDUCATION/TRAINING PROGRAM

## 2023-10-26 NOTE — LETTER
10/26/2023    Provider Not In System       RE: Rito Solano       Dear Colleague,     I had the pleasure of seeing Rito Solano in the [facility].          Assessment/Recommendations   Assessment/Plan:  1.  Coronary artery disease: CT coronary angiogram done on 9/14/23 showed mixed calcific and noncalcific plaque in the mid LAD causing moderate stenosis (50 to 69%) with a high likelihood of lesion specific ischemia with FFRct 0.77.     Currently on no medications.    Discussed about the indication for coronary angiogram/possible PCI and the risks associated with angiogram including <0.1% of MI and CVA or death or any of the following to the degree that it could threaten her life: allergic reaction, arrhythmia, renal failure, hemorrhage, thrombosis and infection.  Risk associated with therapeutic intervention includes 2% or less risk of MI, less than 1% risk of CVA, emergency her surgery, death, less than 4% risk of vascular injury requiring surgical repair or blood transfusion with 20-30% risk of restenosis with a bare-metal stent and less than 10% risk of stenosis with a drug-eluting stent.     Most recent BMP and CBC 7/14/23 were unremarkable.   Patient had phone call with Lara Olivia RN for pre-angio teach.     Plan:  - Lab work today- BMP, CBC, lipids, T&S  - Avoid strenuous exercise or lifting heavy objects until further direction  - Take 4, 81mg of Aspirin morning on procedure  - Please seek medical attention if persistent worsening chest pain/tightness/pressure, shortness of breath, lightheaded or dizziness  - Pre angio teach by Lara Olivia RN     History of Present Illness/Subjective    Rito Solano is a 67 year old years old  with a significant PMH of moderate CAD as evidenced on recent CT coronary angiogram is here at [facility] for pre procedure history and physical examination for coronary angiogram.  Patient noted he was previously having a burning  sensation in his chest that was coming and going lasting 2 to 3 weeks on and off.  Did have some shortness of breath with exertion at that time as well.  Symptoms had improved since before getting the CT coronary angiogram and have not returned since.    His recent CTA angiogram done on 9/14/23 showed mixed calcific and noncalcific plaque in the mid LAD causing moderate stenosis (50 to 69%) with a high likelihood of lesion specific ischemia with FFRct 0.77. Dr. Vazquez has recommended coronary angiogram with possible coronary intervention.     He denies fatigue, lightheadedness, shortness of breath, dyspnea on exertion, orthopnea, PND, palpitations, chest pain, abdominal fullness/bloating, and lower extremity edema.  He further denies fever, chills, N/V, diarrhea, vomiting, constipation, hematochezia, hematemesis, hemoptysis. He also denies malignant hyperthermia, stroke/TIA, bleeding or clotting disorders, COPD/Asthma/JOSÉ MIGUEL, anaphylaxis reaction to medications, IV contrast or sea food.         CTA angiogram 9/14/23 -reviewed    Mixed calcific and noncalcific plaque in the mid LAD causing moderate (50-69%) stenosis. High likelihood of lesion-specific ischemia with FFRct 0.77.    Otherwise nonobstructive coronary artery disease comprised of mixed calcific and noncalcific plaque with an overall moderate burden of atherosclerosis.    Radiology review for incidental non cardiac findings will be under separate report by the radiologist.    Holter Study on 4/11/22-reviewed:  Holter monitoring from 4/11/2022 to 4/12/2022 (duration 24hrs). Predominant underlying rhythm was sinus rhythm, 58 to 118bpm, average 82bpm No sustained tachyarrhythmias. No atrial fibrillation. There were no pauses of greater than 3 seconds. Rare supraventricular ectopic beats (<1%). Rare premature ventricular contractions (1%). Symptoms of palpitations correlated to sinus rhythm 76-110bpm. Impression Sinus rhythm without sustained tachyarrhythmia,  frequent ectopy or significant bradyarrhythmia    EKG 7/14/23- reviewed:  Sinus rhythm  Left axis deviation  Abnormal ECG  When compared with ECG of 14-JUL-2023 10:45,  No significant change was found  Confirmed by SEE ED PROVIDER NOTE FOR, ECG INTERPRETATION ([id number]),  JERRY MONROY ([id number]) on 7/14/2023 2:01:05 PM   Rate: 68     Physical Examination Review of Systems   /60 (BP Location: Right arm, Patient Position: Sitting, Cuff Size: Adult Regular)   Pulse 81   Resp 20   Wt 82.1 kg (180 lb 14.4 oz)   SpO2 98%   BMI 23.23 kg/m    Body mass index is 23.23 kg/m .  Wt Readings from Last 3 Encounters:   07/28/23 84.8 kg (187 lb)   07/14/23 83.9 kg (185 lb)   07/14/23 83.9 kg (185 lb)     General Appearance:   no distress, normal body habitus   ENT/Mouth: membranes moist    EYES:  no scleral icterus, normal conjunctivae   Neck: no carotid bruits or thyromegaly   Chest/Lungs:   lungs are clear to auscultation, no rales or wheezing, equal chest wall expansion    Cardiovascular:   Regular. Normal first and second heart sounds with no murmurs, rubs, or gallops; the carotid, radial pulses are intact, Jugular venous pressure normal, no edema bilaterally    Abdomen:  no organomegaly, masses, bruits, or tenderness; bowel sounds are present   Extremities   no cyanosis or clubbing.  Radial pulses intact and symmetrical.  CMS intact.   Skin: no xanthelasma, warm.    Neurologic: normal  bilateral, no tremors     Psychiatric: alert and oriented x3, calm                                                        Negative unless noted in HPI     Medical History  Surgical History Family History Social History   Past Medical History:   Diagnosis Date    Irregular heart beat     Past Surgical History:   Procedure Laterality Date    HERNIORRHAPHY INGUINAL Right 7/12/2022    Procedure: HERNIORRHAPHY, INGUINAL, OPEN;  Surgeon: Elyse Murrell DO;  Location: [facility]    PAROTIDECTOMY      WISDOM TOOTH EXTRACTION       Family History   Problem Relation Age of Onset    Diabetes Father     Coronary Artery Disease Brother 63        cabg    Heart Disease Brother     Coronary Artery Disease Brother 63        cabg    Heart Disease Brother     Social History     Socioeconomic History    Marital status:      Spouse name: Not on file    Number of children: Not on file    Years of education: Not on file    Highest education level: Not on file   Occupational History    Not on file   Tobacco Use    Smoking status: Never    Smokeless tobacco: Never   Substance and Sexual Activity    Alcohol use: Yes     Comment: 1-2 / week    Drug use: No    Sexual activity: Not on file   Other Topics Concern    Parent/sibling w/ CABG, MI or angioplasty before 65F 55M? No   Social History Narrative    Not on file     Social Determinants of Health     Financial Resource Strain: Not on file   Food Insecurity: Not on file   Transportation Needs: Not on file   Physical Activity: Not on file   Stress: Not on file   Social Connections: Not on file   Interpersonal Safety: Not on file   Housing Stability: Not on file          Medications  Allergies   No current outpatient medications on file.    No Known Allergies      Lab Results    Chemistry/lipid CBC Cardiac Enzymes/BNP/TSH/INR   Lab Results   Component Value Date    BUN 24 (H) 07/14/2023     07/14/2023    CO2 29 07/14/2023    Lab Results   Component Value Date    WBC 6.2 07/14/2023    HGB 13.7 07/14/2023    HCT 40.2 07/14/2023    MCV 89 07/14/2023     (L) 07/14/2023    Lab Results   Component Value Date    TROPONINI <0.01 07/14/2023    TSH 1.08 03/28/2022          This note has been dictated using voice recognition software. Any grammatical, typographical, or context distortions are unintentional and inherent to the software    Yolanda Prince PA-C          Thank you for allowing me to participate in the care of your patient.      Sincerely,     Yolanda Prince PA-C     [facility]  [facility]  cc:   Alejandra Vazquez MD  [address], [address]  [place] [address]

## 2023-10-26 NOTE — PATIENT INSTRUCTIONS
Rito Solano,    It was a pleasure to see you today at the [facility].     My recommendations after this visit include:    - Take 4 81 mg aspirin tomorrow morning with a small sip of water  - Avoid strenuous exercise or lifting heavy objects until further direction  - Please seek medical attention if persistent worsening chest pain/tightness/pressure, shortness of breath, lightheaded or dizziness    - Please call [phone], if you have any questions or concerns      Yolanda Prince PA-C

## 2023-10-27 ENCOUNTER — HOSPITAL ENCOUNTER (OUTPATIENT)
Facility: HOSPITAL | Age: 68
Discharge: HOME | End: 2023-10-27
Attending: INTERNAL MEDICINE | Admitting: INTERNAL MEDICINE
Payer: MEDICARE

## 2023-10-27 VITALS
DIASTOLIC BLOOD PRESSURE: 55 MMHG | HEART RATE: 71 BPM | OXYGEN SATURATION: 98 % | TEMPERATURE: 98 F | RESPIRATION RATE: 16 BRPM | SYSTOLIC BLOOD PRESSURE: 106 MMHG

## 2023-10-27 DIAGNOSIS — R07.9 CHEST PAIN, UNSPECIFIED TYPE: ICD-10-CM

## 2023-10-27 DIAGNOSIS — R93.1 ABNORMAL FINDINGS ON DIAGNOSTIC IMAGING OF HEART AND CORONARY CIRCULATION: ICD-10-CM

## 2023-10-27 DIAGNOSIS — I25.10 CORONARY ARTERY DISEASE INVOLVING NATIVE CORONARY ARTERY OF NATIVE HEART WITHOUT ANGINA PECTORIS: Primary | ICD-10-CM

## 2023-10-27 PROBLEM — Z98.890 STATUS POST CORONARY ANGIOGRAM: Status: ACTIVE | Noted: 2023-10-27

## 2023-10-27 LAB
ABO/RH(D): NORMAL
ANION GAP SERPL CALCULATED.3IONS-SCNC: 12 MMOL/L (ref 7–15)
ANTIBODY SCREEN: NEGATIVE
ATRIAL RATE - MUSE: 77 BPM
BUN SERPL-MCNC: 17.8 MG/DL (ref 8–23)
CALCIUM SERPL-MCNC: 8.9 MG/DL (ref 8.8–10.2)
CHLORIDE SERPL-SCNC: 102 MMOL/L (ref 98–107)
CHOLEST SERPL-MCNC: 147 MG/DL
CREAT SERPL-MCNC: 0.81 MG/DL (ref 0.67–1.17)
DEPRECATED HCO3 PLAS-SCNC: 27 MMOL/L (ref 22–29)
DIASTOLIC BLOOD PRESSURE - MUSE: NORMAL MMHG
EGFRCR SERPLBLD CKD-EPI 2021: >90 ML/MIN/1.73M2
ERYTHROCYTE [DISTWIDTH] IN BLOOD BY AUTOMATED COUNT: 11.9 % (ref 10–15)
GLUCOSE SERPL-MCNC: 104 MG/DL (ref 70–99)
HCT VFR BLD AUTO: 41.1 % (ref 40–53)
HDLC SERPL-MCNC: 44 MG/DL
HGB BLD-MCNC: 13.8 G/DL (ref 13.3–17.7)
HOLD SPECIMEN: NORMAL
INTERPRETATION ECG - MUSE: NORMAL
LDLC SERPL CALC-MCNC: 87 MG/DL
MCH RBC QN AUTO: 30.4 PG (ref 26.5–33)
MCHC RBC AUTO-ENTMCNC: 33.6 G/DL (ref 31.5–36.5)
MCV RBC AUTO: 91 FL (ref 78–100)
NONHDLC SERPL-MCNC: 103 MG/DL
P AXIS - MUSE: NORMAL DEGREES
PLATELET # BLD AUTO: 131 10E3/UL (ref 150–450)
POTASSIUM SERPL-SCNC: 3.9 MMOL/L (ref 3.4–5.3)
PR INTERVAL - MUSE: 148 MS
QRS DURATION - MUSE: 110 MS
QT - MUSE: 398 MS
QTC - MUSE: 450 MS
R AXIS - MUSE: -29 DEGREES
RBC # BLD AUTO: 4.54 10E6/UL (ref 4.4–5.9)
SODIUM SERPL-SCNC: 141 MMOL/L (ref 135–145)
SPECIMEN EXPIRATION DATE: NORMAL
SYSTOLIC BLOOD PRESSURE - MUSE: NORMAL MMHG
T AXIS - MUSE: 140 DEGREES
TRIGL SERPL-MCNC: 80 MG/DL
VENTRICULAR RATE- MUSE: 77 BPM
WBC # BLD AUTO: 3.9 10E3/UL (ref 4–11)

## 2023-10-27 PROCEDURE — 999N000054 HC STATISTIC EKG NON-CHARGEABLE

## 2023-10-27 PROCEDURE — 86901 BLOOD TYPING SEROLOGIC RH(D): CPT | Performed by: INTERNAL MEDICINE

## 2023-10-27 PROCEDURE — 258N000003 HC RX IP 258 OP 636: Performed by: INTERNAL MEDICINE

## 2023-10-27 PROCEDURE — 255N000002 HC RX 255 OP 636: Performed by: INTERNAL MEDICINE

## 2023-10-27 PROCEDURE — C1894 INTRO/SHEATH, NON-LASER: HCPCS | Performed by: INTERNAL MEDICINE

## 2023-10-27 PROCEDURE — 272N000001 HC OR GENERAL SUPPLY STERILE: Performed by: INTERNAL MEDICINE

## 2023-10-27 PROCEDURE — 250N000011 HC RX IP 250 OP 636: Performed by: INTERNAL MEDICINE

## 2023-10-27 PROCEDURE — 99153 MOD SED SAME PHYS/QHP EA: CPT | Performed by: INTERNAL MEDICINE

## 2023-10-27 PROCEDURE — 93454 CORONARY ARTERY ANGIO S&I: CPT | Performed by: INTERNAL MEDICINE

## 2023-10-27 PROCEDURE — 250N000009 HC RX 250: Performed by: INTERNAL MEDICINE

## 2023-10-27 PROCEDURE — 86850 RBC ANTIBODY SCREEN: CPT | Performed by: INTERNAL MEDICINE

## 2023-10-27 PROCEDURE — 93571 IV DOP VEL&/PRESS C FLO 1ST: CPT | Mod: 26 | Performed by: INTERNAL MEDICINE

## 2023-10-27 PROCEDURE — 80061 LIPID PANEL: CPT | Performed by: INTERNAL MEDICINE

## 2023-10-27 PROCEDURE — 80048 BASIC METABOLIC PNL TOTAL CA: CPT | Performed by: INTERNAL MEDICINE

## 2023-10-27 PROCEDURE — 36415 COLL VENOUS BLD VENIPUNCTURE: CPT | Performed by: INTERNAL MEDICINE

## 2023-10-27 PROCEDURE — 99152 MOD SED SAME PHYS/QHP 5/>YRS: CPT | Performed by: INTERNAL MEDICINE

## 2023-10-27 PROCEDURE — 93454 CORONARY ARTERY ANGIO S&I: CPT | Mod: 26 | Performed by: INTERNAL MEDICINE

## 2023-10-27 PROCEDURE — C1769 GUIDE WIRE: HCPCS | Performed by: INTERNAL MEDICINE

## 2023-10-27 PROCEDURE — 93799 UNLISTED CV SVC/PROCEDURE: CPT | Performed by: INTERNAL MEDICINE

## 2023-10-27 PROCEDURE — 93005 ELECTROCARDIOGRAM TRACING: CPT

## 2023-10-27 PROCEDURE — 93010 ELECTROCARDIOGRAM REPORT: CPT | Mod: HOP | Performed by: GENERAL ACUTE CARE HOSPITAL

## 2023-10-27 PROCEDURE — 85014 HEMATOCRIT: CPT | Performed by: INTERNAL MEDICINE

## 2023-10-27 RX ORDER — LIDOCAINE 40 MG/G
CREAM TOPICAL
Status: DISCONTINUED | OUTPATIENT
Start: 2023-10-27 | End: 2023-10-27 | Stop reason: HOSPADM

## 2023-10-27 RX ORDER — METOPROLOL SUCCINATE 25 MG/1
25 TABLET, EXTENDED RELEASE ORAL DAILY
Status: DISCONTINUED | OUTPATIENT
Start: 2023-10-27 | End: 2023-10-27 | Stop reason: HOSPADM

## 2023-10-27 RX ORDER — SODIUM CHLORIDE 9 MG/ML
INJECTION, SOLUTION INTRAVENOUS CONTINUOUS
Status: DISCONTINUED | OUTPATIENT
Start: 2023-10-27 | End: 2023-10-27 | Stop reason: HOSPADM

## 2023-10-27 RX ORDER — ATORVASTATIN CALCIUM 40 MG/1
40 TABLET, FILM COATED ORAL DAILY
Qty: 90 TABLET | Refills: 3 | Status: SHIPPED | OUTPATIENT
Start: 2023-10-27 | End: 2023-10-27

## 2023-10-27 RX ORDER — ASPIRIN 81 MG/1
243 TABLET, CHEWABLE ORAL ONCE
Status: DISCONTINUED | OUTPATIENT
Start: 2023-10-27 | End: 2023-10-27 | Stop reason: HOSPADM

## 2023-10-27 RX ORDER — NALOXONE HYDROCHLORIDE 0.4 MG/ML
0.2 INJECTION, SOLUTION INTRAMUSCULAR; INTRAVENOUS; SUBCUTANEOUS
Status: DISCONTINUED | OUTPATIENT
Start: 2023-10-27 | End: 2023-10-27 | Stop reason: HOSPADM

## 2023-10-27 RX ORDER — NALOXONE HYDROCHLORIDE 0.4 MG/ML
0.4 INJECTION, SOLUTION INTRAMUSCULAR; INTRAVENOUS; SUBCUTANEOUS
Status: DISCONTINUED | OUTPATIENT
Start: 2023-10-27 | End: 2023-10-27 | Stop reason: HOSPADM

## 2023-10-27 RX ORDER — OXYCODONE HYDROCHLORIDE 5 MG/1
5 TABLET ORAL EVERY 4 HOURS PRN
Status: DISCONTINUED | OUTPATIENT
Start: 2023-10-27 | End: 2023-10-27 | Stop reason: HOSPADM

## 2023-10-27 RX ORDER — IODIXANOL 320 MG/ML
INJECTION, SOLUTION INTRAVASCULAR
Status: DISCONTINUED | OUTPATIENT
Start: 2023-10-27 | End: 2023-10-27 | Stop reason: HOSPADM

## 2023-10-27 RX ORDER — FENTANYL CITRATE 50 UG/ML
INJECTION, SOLUTION INTRAMUSCULAR; INTRAVENOUS
Status: DISCONTINUED | OUTPATIENT
Start: 2023-10-27 | End: 2023-10-27 | Stop reason: HOSPADM

## 2023-10-27 RX ORDER — ASPIRIN 81 MG/1
81 TABLET ORAL DAILY
Status: DISCONTINUED | OUTPATIENT
Start: 2023-10-28 | End: 2023-10-27 | Stop reason: HOSPADM

## 2023-10-27 RX ORDER — OXYCODONE HYDROCHLORIDE 5 MG/1
10 TABLET ORAL EVERY 4 HOURS PRN
Status: DISCONTINUED | OUTPATIENT
Start: 2023-10-27 | End: 2023-10-27 | Stop reason: HOSPADM

## 2023-10-27 RX ORDER — HEPARIN SODIUM 1000 [USP'U]/ML
INJECTION, SOLUTION INTRAVENOUS; SUBCUTANEOUS
Status: DISCONTINUED | OUTPATIENT
Start: 2023-10-27 | End: 2023-10-27 | Stop reason: HOSPADM

## 2023-10-27 RX ORDER — ATORVASTATIN CALCIUM 40 MG/1
40 TABLET, FILM COATED ORAL DAILY
Qty: 90 TABLET | Refills: 3 | Status: SHIPPED | OUTPATIENT
Start: 2023-10-27 | End: 2023-11-28

## 2023-10-27 RX ORDER — ASPIRIN 325 MG
325 TABLET ORAL ONCE
Status: DISCONTINUED | OUTPATIENT
Start: 2023-10-27 | End: 2023-10-27 | Stop reason: HOSPADM

## 2023-10-27 RX ORDER — NITROGLYCERIN 5 MG/ML
VIAL (ML) INTRAVENOUS
Status: DISCONTINUED | OUTPATIENT
Start: 2023-10-27 | End: 2023-10-27 | Stop reason: HOSPADM

## 2023-10-27 RX ORDER — FENTANYL CITRATE 50 UG/ML
25 INJECTION, SOLUTION INTRAMUSCULAR; INTRAVENOUS
Status: DISCONTINUED | OUTPATIENT
Start: 2023-10-27 | End: 2023-10-27 | Stop reason: HOSPADM

## 2023-10-27 RX ORDER — DIAZEPAM 5 MG
5 TABLET ORAL ONCE
Status: DISCONTINUED | OUTPATIENT
Start: 2023-10-27 | End: 2023-10-27 | Stop reason: HOSPADM

## 2023-10-27 RX ORDER — FLUMAZENIL 0.1 MG/ML
0.2 INJECTION, SOLUTION INTRAVENOUS
Status: DISCONTINUED | OUTPATIENT
Start: 2023-10-27 | End: 2023-10-27 | Stop reason: HOSPADM

## 2023-10-27 RX ORDER — ACETAMINOPHEN 325 MG/1
650 TABLET ORAL EVERY 4 HOURS PRN
Status: DISCONTINUED | OUTPATIENT
Start: 2023-10-27 | End: 2023-10-27 | Stop reason: HOSPADM

## 2023-10-27 RX ADMIN — SODIUM CHLORIDE: 9 INJECTION, SOLUTION INTRAVENOUS at 08:32

## 2023-10-27 ASSESSMENT — ACTIVITIES OF DAILY LIVING (ADL)
ADLS_ACUITY_SCORE: 35

## 2023-10-27 NOTE — PROGRESS NOTES
Discharged to home following radial angiogram. Right wrist site intact with no bleeding or hematoma. Pt stated concerns about hand swelling. Ambika MARTINEZ CNP called to evaluate. Pt advised to elevate at home. No other treatment needed at this time. Fingers warm, pink with good cap refill. Denies numbness. Pt given discharge instructions and clinic will be contacting him for follow up Appt.

## 2023-10-27 NOTE — DISCHARGE INSTRUCTIONS

## 2023-10-27 NOTE — PRE-PROCEDURE
GENERAL PRE-PROCEDURE:   Procedure:  Coronary angiogram with possible PCI  Date/Time:  10/27/2023 8:46 AM    Written consent obtained?: Yes    Risks and benefits: Risks, benefits and alternatives were discussed    Consent given by:  Patient  Patient states understanding of procedure being performed: Yes    Patient's understanding of procedure matches consent: Yes    Procedure consent matches procedure scheduled: Yes    Expected level of sedation:  Moderate  Appropriately NPO:  Yes  ASA Class:  3 (CAD by virtue of CT scan, burning chest pain, + family hx of CAD)  Mallampati  :  Grade 1- soft palate, uvula, tonsillar pillars, and posterior pharyngeal wall visible  Lungs:  Lungs clear with good breath sounds bilaterally  Heart:  Normal heart sounds and rate  History & Physical reviewed:  History and physical reviewed and updates made (see comment)  H&P Comments:  Clinically Significant Risk Factors Present on Admission    Cardiovascular : exertional chest pain, CAD by virtue of CT scan, abnormal EKG, family hx of CAD    Fluid & Electrolyte Disorders : Not present on admission    Gastroenterology : Not present on admission    Hematology/Oncology : Not present on admission    Nephrology : Not present on admission    Neurology : Not present on admission    Pulmonology : Not present on admission    Systemic : Not present on admission    Statement of review:  I have reviewed the lab findings, diagnostic data, medications, and the plan for sedation

## 2023-10-30 ENCOUNTER — TELEPHONE (OUTPATIENT)
Dept: CARDIOLOGY | Facility: CLINIC | Age: 68
End: 2023-10-30
Payer: MEDICARE

## 2023-10-30 NOTE — TELEPHONE ENCOUNTER
[facility]    Phone Message    May a detailed message be left on voicemail: yes     Reason for Call: Other: Pt would like a call back asap as he is having side effects to his statin that was just prescribed , he is having stomach pain and brain fog with headaches     Action Taken: Other: Cardio    Travel Screening: Not Applicable

## 2023-10-30 NOTE — TELEPHONE ENCOUNTER
Pt reports headache, brain fog, stomach pain, and insomnia since starting atorvastatin 40 mg post 10/27/23 angiogram per recommendation from Dr Eng.    Dr Vazquez - pt has not been on statins before, any new recommendations?  -ral

## 2023-10-31 ENCOUNTER — OFFICE VISIT (OUTPATIENT)
Dept: FAMILY MEDICINE | Facility: CLINIC | Age: 68
End: 2023-10-31
Payer: MEDICARE

## 2023-10-31 VITALS
OXYGEN SATURATION: 97 % | HEART RATE: 85 BPM | RESPIRATION RATE: 16 BRPM | SYSTOLIC BLOOD PRESSURE: 150 MMHG | TEMPERATURE: 97.6 F | DIASTOLIC BLOOD PRESSURE: 77 MMHG

## 2023-10-31 DIAGNOSIS — I83.90 ASYMPTOMATIC VARICOSE VEINS: Primary | ICD-10-CM

## 2023-10-31 PROCEDURE — 99213 OFFICE O/P EST LOW 20 MIN: CPT | Performed by: PHYSICIAN ASSISTANT

## 2023-10-31 NOTE — PROGRESS NOTES
Assessment & Plan:      Problem List Items Addressed This Visit    None  Visit Diagnoses       Asymptomatic varicose veins    -  Primary          Medical Decision Making  Patient presents with a swollen bump in the left lower extremity with concerns for possible DVT given recent angiogram 4 days ago.  Examination of the legs appears very reassuring.  Patient's feared lump is consistent with varicose veins.  No signs for DVT or cellulitis or hematoma.  Discussed signs of worsening symptoms and when to return for reevaluation if needed.  Patient also noted his elevated blood pressure.  Do feel that this is likely a response to acute concern for DVT.  Recommend he continue to follow-up with cardiology and they will watch his blood pressure as needed.  Patient otherwise has no history of hypertension and is not currently on hypertension medications.     Subjective:      Rito Solano is a 67 year old male here for evaluation of a swollen bump in the lower extremity.  Patient first noticed symptoms 24 hours ago.  He has concerns for possible blood clot given recent angiogram 4 days ago.  Patient tolerated the procedure well.  He has follow-up with cardiology in several days.     The following portions of the patient's history were reviewed and updated as appropriate: allergies, current medications, and problem list.     Review of Systems  Pertinent items are noted in HPI.    Allergies  No Known Allergies    Family History   Problem Relation Age of Onset    Diabetes Father     Coronary Artery Disease Brother 63        cabg    Heart Disease Brother     Coronary Artery Disease Brother 63        cabg    Heart Disease Brother        Social History     Tobacco Use    Smoking status: Never    Smokeless tobacco: Never   Substance Use Topics    Alcohol use: Yes     Comment: 1-2 / week        Objective:      BP (!) 150/77   Pulse 85   Temp 97.6  F (36.4  C) (Oral)   Resp 16   SpO2 97%   General appearance - alert, well  appearing, and in no distress and non-toxic  Extremities - lower extremities: Presence of mild to moderate varicose veins, worse in left lower extremity compared to right, otherwise no signs of edema or cyanosis  Skin - lower extremities: No tender masses, no swelling, no erythema, skin is normal warmth to touch    The use of Dragon/Zenboxation services was used to construct the content of this note; any grammatical errors are non-intentional. Please contact the author directly if you are in need of any clarification.

## 2023-10-31 NOTE — TELEPHONE ENCOUNTER
"----- Message -----  From: Alejandra Vazquez MD  Sent: 10/31/2023   1:53 PM CDT  To: Roya Garner RN    Tried calling the patient's cell phone and it rolled to voicemail but unfortunately his voicemail box is full and I cannot leave a message.  I then called the home number and reached his wife.  She did not know if he had a [employer] account and told me I should leave a message for him on his mobile phone.  When I again told her that his cell phone mailbox was full, she told me it was \"not her problem\".    KML  "

## 2023-10-31 NOTE — TELEPHONE ENCOUNTER
[facility] Call [facility]    Phone Message    May a detailed message be left on voicemail: yes     Reason for Call: Other: Please call pt to discuss statins. Dr. Vazquez is out until next week it looks like. Thank you     Action Taken: Message routed to:  Other: Cardiology    Travel Screening: Not Applicable      Thank you!  [facility]

## 2023-11-02 ENCOUNTER — TELEPHONE (OUTPATIENT)
Dept: CARDIOLOGY | Facility: CLINIC | Age: 68
End: 2023-11-02

## 2023-11-02 ENCOUNTER — MYC MEDICAL ADVICE (OUTPATIENT)
Dept: CARDIOLOGY | Facility: CLINIC | Age: 68
End: 2023-11-02
Payer: MEDICARE

## 2023-11-02 NOTE — TELEPHONE ENCOUNTER
[place] Community Me[place] Blood Pressure [place] reviewed.  Patients recent blood pressure is outside of guideline parameters. Geni message sent to patient with instructions to recheck blood pressure and report results. Will await response back for further review.

## 2023-11-06 NOTE — TELEPHONE ENCOUNTER
Called patient to review recent elevated blood pressure reading.  Per [place] Community Measures guidelines, patients blood pressure is out of parameters and recheck blood pressure is recommended.    Last Blood Pressure:  150/77  Last Heart Rate: 85  Date: 10/31/23  Location: Other Specialty    Today's Blood Pressure: 125/78  Today's Heart Rate: not provided  Location: Home BP    Patient reported blood pressure updated in Epic. Blood pressure falls within [place] Community Measures guidelines.  Patient will follow up as previously advised.      Blood Pressure from 3 days ago was 146/98.

## 2023-11-14 NOTE — PROGRESS NOTES
Assessment/Recommendations   Assessment/Plan:  1.  Coronary artery disease: CT coronary angiogram done on 9/14/23 showed mixed calcific and noncalcific plaque in the mid LAD causing moderate stenosis (50 to 69%) with a high likelihood of lesion specific ischemia with FFRct 0.77.  Patient underwent coronary angiogram 10/27/2023.  This showed 50 to 70% stenosis of the mid LAD.  IFR of the LAD was 0.87.  Medical therapy was recommended for patient's coronary artery disease.  Patient wishes as his symptoms of angina had resolved. Patient was started on atorvastatin which he has since stopped given side effects. Last LDL 10/27/23 was 87.     Patient notes side effects starting an hour after first taking atorvastatin including headache, brain fog, stomach pain, muscle aches. Patient cut dosage in half without improvement. Patient stopped this medication two weeks ago and notes symptoms are slowing starting to improve. We discussed the importance of statin or other cholesterol lowering medication given his LAD lesion. Patient is hesitant as he feels his cholesterol numbers are well controlled. Educated on benefits of statins and preventing further plaque build up. Also discussed starting metoprolol and the benefits of this medication which patient declined at this time.     Patient had concern for diabetes and lyme disease contributing to heart issues or other symptoms. Will test these today. Also repeating lipids and liver enzymes today. Patient will think about a different statin medication such as pravastatin (declined rosuvastatin) pending results from labs today.       Plan:  Recheck fasting lipids and AST/ALT today  2.   Check Hgb A1c and lyme test per patient's request  3.   Strongly consider pravastatin and metoprolol in the future if patient is willing as patient has a 50-70% stenosis in his LAD and declined stent placement.   4.    Follow up with Dr. Vazquez pending results and patient's decision about  medication         History of Present Illness/Subjective    Rito Solano is a 67 year old years old  with a significant PMH of moderate CAD as evidenced on recent CT coronary angiogram who underwent coronary angiogram 10/27/2023 that showed 50 to 70% stenosis of the LAD with an IFR of 0.87.  Medical therapy instead of stent placement is being pursued per patient's wishes as angina had improved.  Patient noted he was previously having a burning sensation in his chest that was coming and going lasting 2 to 3 weeks on and off.  Did have some shortness of breath with exertion at that time as well.  Symptoms had improved since before getting the CT coronary angiogram and have not returned since.    Patient was started on atorvastatin 40 mg, ASA 81 mg post angiogram 10/27/23 and was reporting headache, brain fog, stomach pain, and insomnia. Tried cutting pill in half which didn't help. Patient notes his blood pressure has been elevated as well. Stopped statin 2 weeks ago. Since stopping statin the headache and brain fog is improving. Muscle pain and stomach pain are gone.     Patient also has concern about blood sugar levels and lyme disease. Dad has DM II and patient noted his glucose was slightly elevated in the hospital. Patient notes when he was in [place] he developed a bulls eye rash from tick bite and was never seen. Never developed symptoms.     Hasn't had same chest pain as before but since angiogram does have some shortness of breath. Unsure if related to anxiety. More with exertion like climbing up steps.     BP at home was elevated when taking statin medication per patient- 157/98. Now is improved to 128/75. Declined beta blocker.     He denies fatigue, lightheadedness, shortness of breath, dyspnea on exertion, orthopnea, PND, palpitations, chest pain, abdominal fullness/bloating, and lower extremity edema.        Coronary angiogram 10/27/2023:  CONCLUSIONS: Single-vessel obstructive coronary artery disease  "involving the mid LAD (iFR 0.87) with mild to moderate nonobstructive disease elsewhere.     RECOMMENDATIONS:   Usual postprocedure cares, please see orders.  Recommend a trial of medical therapy for patient's coronary artery disease per patient wishes as his symptoms of angina have resolved and he is on no antianginal therapy (will initiate a beta-blocker).    Aggressive risk factor modification for secondary prevention (we will start him on aspirin and atorvastatin therapy).    CTA angiogram 9/14/23 -reviewed    Mixed calcific and noncalcific plaque in the mid LAD causing moderate (50-69%) stenosis. High likelihood of lesion-specific ischemia with FFRct 0.77.    Otherwise nonobstructive coronary artery disease comprised of mixed calcific and noncalcific plaque with an overall moderate burden of atherosclerosis.    Radiology review for incidental non cardiac findings will be under separate report by the radiologist.    Holter Study on 4/11/22-reviewed:  Holter monitoring from 4/11/2022 to 4/12/2022 (duration 24hrs). Predominant underlying rhythm was sinus rhythm, 58 to 118bpm, average 82bpm No sustained tachyarrhythmias. No atrial fibrillation. There were no pauses of greater than 3 seconds. Rare supraventricular ectopic beats (<1%). Rare premature ventricular contractions (1%). Symptoms of palpitations correlated to sinus rhythm 76-110bpm. Impression Sinus rhythm without sustained tachyarrhythmia, frequent ectopy or significant bradyarrhythmia    EKG 7/14/23- reviewed:  Sinus rhythm  Left axis deviation  Abnormal ECG  When compared with ECG of 14-JUL-2023 10:45,  No significant change was found  Confirmed by SEE ED PROVIDER NOTE FOR, ECG INTERPRETATION (4000),  JERRY MONROY ([id number]) on 7/14/2023 2:01:05 PM   Rate: 68     Physical Examination Review of Systems   /70 (BP Location: Right arm, Patient Position: Sitting, Cuff Size: Adult Regular)   Pulse 69   Resp 16   Ht 1.88 m (6' 2\")   Wt 81.8 kg " (180 lb 6.4 oz)   SpO2 99%   BMI 23.16 kg/m    Body mass index is 23.16 kg/m .  Wt Readings from Last 3 Encounters:   10/26/23 82.1 kg (180 lb 14.4 oz)   07/28/23 84.8 kg (187 lb)   07/14/23 83.9 kg (185 lb)     General Appearance:   no distress, normal body habitus   ENT/Mouth: membranes moist    EYES:  no scleral icterus, normal conjunctivae   Neck: no carotid bruits or thyromegaly   Chest/Lungs:   lungs are clear to auscultation, no rales or wheezing, equal chest wall expansion    Cardiovascular:   Regular. Normal first and second heart sounds with no murmurs, rubs, or gallops; the carotid, radial pulses are intact, Jugular venous pressure normal, no edema bilaterally        Extremities   no cyanosis or clubbing.  Radial pulses intact and symmetrical.  CMS intact.   Skin: no xanthelasma, warm.    Neurologic: no tremors     Psychiatric: alert and oriented x3, calm                                                        Negative unless noted in HPI     Medical History  Surgical History Family History Social History   Past Medical History:   Diagnosis Date    Irregular heart beat     Past Surgical History:   Procedure Laterality Date    CV CORONARY ANGIOGRAM N/A 10/27/2023    Procedure: Coronary Angiogram;  Surgeon: Phong Eng MD;  Location: [facility] CATH LAB CV    CV INSTANTANEOUS WAVE-FREE RATIO N/A 10/27/2023    Procedure: Instantaneous Wave-Free Ratio;  Surgeon: Phong Eng MD;  Location: [facility] CATH LAB CV    HERNIORRHAPHY INGUINAL Right 7/12/2022    Procedure: HERNIORRHAPHY, INGUINAL, OPEN;  Surgeon: Elyse Murrell DO;  Location: [facility]    PAROTIDECTOMY      WISDOM TOOTH EXTRACTION      Family History   Problem Relation Age of Onset    Diabetes Father     Coronary Artery Disease Brother 63        cabg    Heart Disease Brother     Coronary Artery Disease Brother 63        cabg    Heart Disease Brother     Social History     Socioeconomic History    Marital status:      Spouse  name: Not on file    Number of children: Not on file    Years of education: Not on file    Highest education level: Not on file   Occupational History    Not on file   Tobacco Use    Smoking status: Never    Smokeless tobacco: Never   Substance and Sexual Activity    Alcohol use: Yes     Comment: 1-2 / week    Drug use: No    Sexual activity: Not on file   Other Topics Concern    Parent/sibling w/ CABG, MI or angioplasty before 65F 55M? No   Social History Narrative    Not on file     Social Determinants of Health     Financial Resource Strain: Not on file   Food Insecurity: Not on file   Transportation Needs: Not on file   Physical Activity: Not on file   Stress: Not on file   Social Connections: Not on file   Interpersonal Safety: Not on file   Housing Stability: Not on file          Medications  Allergies   Current Outpatient Medications   Medication Sig Dispense Refill    atorvastatin (LIPITOR) 40 MG tablet Take 1 tablet (40 mg) by mouth daily 90 tablet 3    No Known Allergies      Lab Results    Chemistry/lipid CBC Cardiac Enzymes/BNP/TSH/INR   Lab Results   Component Value Date    CHOL 147 10/27/2023    HDL 44 10/27/2023    TRIG 80 10/27/2023    BUN 17.8 10/27/2023     10/27/2023    CO2 27 10/27/2023    Lab Results   Component Value Date    WBC 3.9 (L) 10/27/2023    HGB 13.8 10/27/2023    HCT 41.1 10/27/2023    MCV 91 10/27/2023     (L) 10/27/2023    Lab Results   Component Value Date    TROPONINI <0.01 07/14/2023    TSH 1.08 03/28/2022          This note has been dictated using voice recognition software. Any grammatical, typographical, or context distortions are unintentional and inherent to the software    Yolanda Prince PA-C

## 2023-11-15 ENCOUNTER — DOCUMENTATION ONLY (OUTPATIENT)
Dept: CARDIOLOGY | Facility: CLINIC | Age: 68
End: 2023-11-15

## 2023-11-15 ENCOUNTER — TELEPHONE (OUTPATIENT)
Dept: CARDIOLOGY | Facility: CLINIC | Age: 68
End: 2023-11-15

## 2023-11-15 ENCOUNTER — OFFICE VISIT (OUTPATIENT)
Dept: CARDIOLOGY | Facility: CLINIC | Age: 68
End: 2023-11-15
Payer: MEDICARE

## 2023-11-15 VITALS
WEIGHT: 180.4 LBS | OXYGEN SATURATION: 99 % | BODY MASS INDEX: 23.15 KG/M2 | DIASTOLIC BLOOD PRESSURE: 70 MMHG | HEART RATE: 69 BPM | SYSTOLIC BLOOD PRESSURE: 130 MMHG | HEIGHT: 74 IN | RESPIRATION RATE: 16 BRPM

## 2023-11-15 DIAGNOSIS — I25.10 CORONARY ARTERY DISEASE INVOLVING NATIVE CORONARY ARTERY OF NATIVE HEART WITHOUT ANGINA PECTORIS: Primary | ICD-10-CM
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
## 2023-11-15 DIAGNOSIS — Z98.890 STATUS POST CORONARY ANGIOGRAM: ICD-10-CM

## 2023-11-15 LAB
ALT SERPL W P-5'-P-CCNC: 12 U/L (ref 0–70)
AST SERPL W P-5'-P-CCNC: 20 U/L (ref 0–45)
B BURGDOR IGG+IGM SER QL: 0.17
CHOLEST SERPL-MCNC: 159 MG/DL
HBA1C MFR BLD: 5.9 %
HDLC SERPL-MCNC: 51 MG/DL
LDLC SERPL CALC-MCNC: 94 MG/DL
NONHDLC SERPL-MCNC: 108 MG/DL
TRIGL SERPL-MCNC: 68 MG/DL

## 2023-11-15 PROCEDURE — 36415 COLL VENOUS BLD VENIPUNCTURE: CPT | Performed by: STUDENT IN AN ORGANIZED HEALTH CARE EDUCATION/TRAINING PROGRAM

## 2023-11-15 PROCEDURE — 80061 LIPID PANEL: CPT | Performed by: STUDENT IN AN ORGANIZED HEALTH CARE EDUCATION/TRAINING PROGRAM

## 2023-11-15 PROCEDURE — 99214 OFFICE O/P EST MOD 30 MIN: CPT | Performed by: STUDENT IN AN ORGANIZED HEALTH CARE EDUCATION/TRAINING PROGRAM

## 2023-11-15 PROCEDURE — 83036 HEMOGLOBIN GLYCOSYLATED A1C: CPT | Performed by: STUDENT IN AN ORGANIZED HEALTH CARE EDUCATION/TRAINING PROGRAM

## 2023-11-15 PROCEDURE — 84450 TRANSFERASE (AST) (SGOT): CPT | Performed by: STUDENT IN AN ORGANIZED HEALTH CARE EDUCATION/TRAINING PROGRAM

## 2023-11-15 PROCEDURE — 86618 LYME DISEASE ANTIBODY: CPT | Performed by: STUDENT IN AN ORGANIZED HEALTH CARE EDUCATION/TRAINING PROGRAM

## 2023-11-15 PROCEDURE — 84460 ALANINE AMINO (ALT) (SGPT): CPT | Performed by: STUDENT IN AN ORGANIZED HEALTH CARE EDUCATION/TRAINING PROGRAM

## 2023-11-15 RX ORDER — ASPIRIN 81 MG/1
81 TABLET, CHEWABLE ORAL DAILY
COMMUNITY

## 2023-11-15 NOTE — PROGRESS NOTES
At approximately 1045, writer and other nurse were called to  for this patient having a syncopal episode and slid to the ground at the desk.     Writer and other nurse ran out and assisted patient into a chair. Pt was awake but not responding to verbal commands. BP taken and it was 69/35 HR 43. O299%. Pt was pale, ashen and clammy. Writer stated his name numerous times and patient appeared to just stare straight ahead, not tracking commands and breathing oddly. Writer asked clinic  to call EMS right away.     CMA grabbed clinic MD here today, Dr. Hicks. Dr. Hicks assessed ptaient and he came to a bit. Repeat blood pressures were 75/49 HR 45-50. MD assessed patient and pulse was weak but as patient came to, he was able to say his name and let us know that he was fasting for blood draw that was done today. He was given apple juice and water. BP went up to 99/61, then 128/75. He said he was hungry and he gets like this with blood draws. He was perspiring. Dr. Hicks recommended ED eval. EMS arrived and MD left to go see patients. EMS did EKG and repeat blood pressures sitting and standing- all over 130 systolic.     Pt had 2 apple juices total, a granola bar and a bag of crackers. Glucose was 104 after food. At this time, [place] that patient saw today, Yolanda, was updated that patient had a syncopal episode in the lobby. She updated writer on recent angiogram and that stent placement was refused. This information was relayed to both EMS and Dr. Hicks. Despite Yolanda sexton and EMS recommended ED evaluation, patient refused. He states that he just needs to eat and then he will be fine. Writer and other nurse stated that he should go be evaluated and risk was explained of not going with EMS. EMS even offered to walk patient to the ED and forgo the ride. Patient still refused. He was advised not to drive by writer and EMS staff.    Pt said he would walk down to cafe and have food. He would then  see how he feels and call his family for assistance. Again, he refused ED eval. Dr. Hicks and Yolanda were both updated on this. Pt followed CMA and writer down to [employer], which is right by ED check in, where he went to go have lunch. [place] [place]

## 2023-11-15 NOTE — TELEPHONE ENCOUNTER
Encounter opened in error- see documentation encounter dated  11/15. -[facility]

## 2023-11-15 NOTE — LETTER
11/15/2023    Provider Not In System       RE: Rito Solano       Dear Colleague,     I had the pleasure of seeing Rito Solano in the [facility].          Assessment/Recommendations   Assessment/Plan:  1.  Coronary artery disease: CT coronary angiogram done on 9/14/23 showed mixed calcific and noncalcific plaque in the mid LAD causing moderate stenosis (50 to 69%) with a high likelihood of lesion specific ischemia with FFRct 0.77.  Patient underwent coronary angiogram 10/27/2023.  This showed 50 to 70% stenosis of the mid LAD.  IFR of the LAD was 0.87.  Medical therapy was recommended for patient's coronary artery disease.  Patient wishes as his symptoms of angina had resolved. Patient was started on atorvastatin which he has since stopped given side effects. Last LDL 10/27/23 was 87.     Patient notes side effects starting an hour after first taking atorvastatin including headache, brain fog, stomach pain, muscle aches. Patient cut dosage in half without improvement. Patient stopped this medication two weeks ago and notes symptoms are slowing starting to improve. We discussed the importance of statin or other cholesterol lowering medication given his LAD lesion. Patient is hesitant as he feels his cholesterol numbers are well controlled. Educated on benefits of statins and preventing further plaque build up. Also discussed starting metoprolol and the benefits of this medication which patient declined at this time.     Patient had concern for diabetes and lyme disease contributing to heart issues or other symptoms. Will test these today. Also repeating lipids and liver enzymes today. Patient will think about a different statin medication such as pravastatin (declined rosuvastatin) pending results from labs today.       Plan:  Recheck fasting lipids and AST/ALT today  2.   Check Hgb A1c and lyme test per patient's request  3.   Strongly consider pravastatin and metoprolol in the future if patient is  willing as patient has a 50-70% stenosis in his LAD and declined stent placement.   4.    Follow up with Dr. Vazquez pending results and patient's decision about medication         History of Present Illness/Subjective    Rito Solano is a 67 year old years old  with a significant PMH of moderate CAD as evidenced on recent CT coronary angiogram who underwent coronary angiogram 10/27/2023 that showed 50 to 70% stenosis of the LAD with an IFR of 0.87.  Medical therapy instead of stent placement is being pursued per patient's wishes as angina had improved.  Patient noted he was previously having a burning sensation in his chest that was coming and going lasting 2 to 3 weeks on and off.  Did have some shortness of breath with exertion at that time as well.  Symptoms had improved since before getting the CT coronary angiogram and have not returned since.    Patient was started on atorvastatin 40 mg, ASA 81 mg post angiogram 10/27/23 and was reporting headache, brain fog, stomach pain, and insomnia. Tried cutting pill in half which didn't help. Patient notes his blood pressure has been elevated as well. Stopped statin 2 weeks ago. Since stopping statin the headache and brain fog is improving. Muscle pain and stomach pain are gone.     Patient also has concern about blood sugar levels and lyme disease. Dad has DM II and patient noted his glucose was slightly elevated in the hospital. Patient notes when he was in [place] he developed a bulls eye rash from tick bite and was never seen. Never developed symptoms.     Hasn't had same chest pain as before but since angiogram does have some shortness of breath. Unsure if related to anxiety. More with exertion like climbing up steps.     BP at home was elevated when taking statin medication per patient- 157/98. Now is improved to 128/75. Declined beta blocker.     He denies fatigue, lightheadedness, shortness of breath, dyspnea on exertion, orthopnea, PND, palpitations, chest pain,  abdominal fullness/bloating, and lower extremity edema.        Coronary angiogram 10/27/2023:  CONCLUSIONS: Single-vessel obstructive coronary artery disease involving the mid LAD (iFR 0.87) with mild to moderate nonobstructive disease elsewhere.     RECOMMENDATIONS:   Usual postprocedure cares, please see orders.  Recommend a trial of medical therapy for patient's coronary artery disease per patient wishes as his symptoms of angina have resolved and he is on no antianginal therapy (will initiate a beta-blocker).    Aggressive risk factor modification for secondary prevention (we will start him on aspirin and atorvastatin therapy).    CTA angiogram 9/14/23 -reviewed    Mixed calcific and noncalcific plaque in the mid LAD causing moderate (50-69%) stenosis. High likelihood of lesion-specific ischemia with FFRct 0.77.    Otherwise nonobstructive coronary artery disease comprised of mixed calcific and noncalcific plaque with an overall moderate burden of atherosclerosis.    Radiology review for incidental non cardiac findings will be under separate report by the radiologist.    Holter Study on 4/11/22-reviewed:  Holter monitoring from 4/11/2022 to 4/12/2022 (duration 24hrs). Predominant underlying rhythm was sinus rhythm, 58 to 118bpm, average 82bpm No sustained tachyarrhythmias. No atrial fibrillation. There were no pauses of greater than 3 seconds. Rare supraventricular ectopic beats (<1%). Rare premature ventricular contractions (1%). Symptoms of palpitations correlated to sinus rhythm 76-110bpm. Impression Sinus rhythm without sustained tachyarrhythmia, frequent ectopy or significant bradyarrhythmia    EKG 7/14/23- reviewed:  Sinus rhythm  Left axis deviation  Abnormal ECG  When compared with ECG of 14-JUL-2023 10:45,  No significant change was found  Confirmed by SEE ED PROVIDER NOTE FOR, ECG INTERPRETATION (4000),  JERRY MONROY ([id number]) on 7/14/2023 2:01:05 PM   Rate: 68     Physical Examination Review of  "Systems   /70 (BP Location: Right arm, Patient Position: Sitting, Cuff Size: Adult Regular)   Pulse 69   Resp 16   Ht 1.88 m (6' 2\")   Wt 81.8 kg (180 lb 6.4 oz)   SpO2 99%   BMI 23.16 kg/m    Body mass index is 23.16 kg/m .  Wt Readings from Last 3 Encounters:   10/26/23 82.1 kg (180 lb 14.4 oz)   07/28/23 84.8 kg (187 lb)   07/14/23 83.9 kg (185 lb)     General Appearance:   no distress, normal body habitus   ENT/Mouth: membranes moist    EYES:  no scleral icterus, normal conjunctivae   Neck: no carotid bruits or thyromegaly   Chest/Lungs:   lungs are clear to auscultation, no rales or wheezing, equal chest wall expansion    Cardiovascular:   Regular. Normal first and second heart sounds with no murmurs, rubs, or gallops; the carotid, radial pulses are intact, Jugular venous pressure normal, no edema bilaterally        Extremities   no cyanosis or clubbing.  Radial pulses intact and symmetrical.  CMS intact.   Skin: no xanthelasma, warm.    Neurologic: no tremors     Psychiatric: alert and oriented x3, calm                                                        Negative unless noted in HPI     Medical History  Surgical History Family History Social History   Past Medical History:   Diagnosis Date    Irregular heart beat     Past Surgical History:   Procedure Laterality Date    CV CORONARY ANGIOGRAM N/A 10/27/2023    Procedure: Coronary Angiogram;  Surgeon: Phong Eng MD;  Location: [facility] CV    CV INSTANTANEOUS WAVE-FREE RATIO N/A 10/27/2023    Procedure: Instantaneous Wave-Free Ratio;  Surgeon: Phong Eng MD;  Location: [facility] CV    HERNIORRHAPHY INGUINAL Right 7/12/2022    Procedure: HERNIORRHAPHY, INGUINAL, OPEN;  Surgeon: Elyse Murrell DO;  Location: [facility]    PAROTIDECTOMY      WISDOM TOOTH EXTRACTION      Family History   Problem Relation Age of Onset    Diabetes Father     Coronary Artery Disease Brother 63        cabg    Heart Disease Brother     " Coronary Artery Disease Brother 63        cabg    Heart Disease Brother     Social History     Socioeconomic History    Marital status:      Spouse name: Not on file    Number of children: Not on file    Years of education: Not on file    Highest education level: Not on file   Occupational History    Not on file   Tobacco Use    Smoking status: Never    Smokeless tobacco: Never   Substance and Sexual Activity    Alcohol use: Yes     Comment: 1-2 / week    Drug use: No    Sexual activity: Not on file   Other Topics Concern    Parent/sibling w/ CABG, MI or angioplasty before 65F 55M? No   Social History Narrative    Not on file     Social Determinants of Health     Financial Resource Strain: Not on file   Food Insecurity: Not on file   Transportation Needs: Not on file   Physical Activity: Not on file   Stress: Not on file   Social Connections: Not on file   Interpersonal Safety: Not on file   Housing Stability: Not on file          Medications  Allergies   Current Outpatient Medications   Medication Sig Dispense Refill    atorvastatin (LIPITOR) 40 MG tablet Take 1 tablet (40 mg) by mouth daily 90 tablet 3    No Known Allergies      Lab Results    Chemistry/lipid CBC Cardiac Enzymes/BNP/TSH/INR   Lab Results   Component Value Date    CHOL 147 10/27/2023    HDL 44 10/27/2023    TRIG 80 10/27/2023    BUN 17.8 10/27/2023     10/27/2023    CO2 27 10/27/2023    Lab Results   Component Value Date    WBC 3.9 (L) 10/27/2023    HGB 13.8 10/27/2023    HCT 41.1 10/27/2023    MCV 91 10/27/2023     (L) 10/27/2023    Lab Results   Component Value Date    TROPONINI <0.01 07/14/2023    TSH 1.08 03/28/2022          This note has been dictated using voice recognition software. Any grammatical, typographical, or context distortions are unintentional and inherent to the software    Yolanda Prince PA-C        Thank you for allowing me to participate in the care of your patient.      Sincerely,     Yolanda Prince PA-C      [facility]  cc:   No referring provider defined for this encounter.

## 2023-11-15 NOTE — PATIENT INSTRUCTIONS
Rito Solano,    It was a pleasure to see you today at the [facility].     My recommendations after this visit include:    - Blood work today including lyme test, hemoglobin A1c, lipids, ALT/AST  - Consider pravastatin and metoprolol in the future when current symptoms are improved  - Follow up pending results    - Please call [phone], if you have any questions or concerns      Yolanda Prince PA-C

## 2023-11-16 ENCOUNTER — MYC MEDICAL ADVICE (OUTPATIENT)
Dept: CARDIOLOGY | Facility: CLINIC | Age: 68
End: 2023-11-16
Payer: MEDICARE

## 2023-11-16 DIAGNOSIS — Z98.890 STATUS POST CORONARY ANGIOGRAM: ICD-10-CM

## 2023-11-16 DIAGNOSIS — I25.10 CORONARY ARTERY DISEASE INVOLVING NATIVE CORONARY ARTERY OF NATIVE HEART WITHOUT ANGINA PECTORIS: Primary | ICD-10-CM

## 2023-11-28 DIAGNOSIS — I25.10 CORONARY ARTERY DISEASE INVOLVING NATIVE CORONARY ARTERY OF NATIVE HEART WITHOUT ANGINA PECTORIS: Primary | ICD-10-CM

## 2023-11-28 RX ORDER — PRAVASTATIN SODIUM 20 MG
20 TABLET ORAL DAILY
Qty: 30 TABLET | Refills: 11 | Status: SHIPPED | OUTPATIENT
Start: 2023-11-28

## 2023-11-28 NOTE — TELEPHONE ENCOUNTER
Msg rec'd 11-28-23 @ 1014:  Yolanda Prince, Lara Barclay RN  Mychart message sent. Please help coordinate 3-4 month follow up with Dr. Vazquez.    Follow-up order placed per protocol - msg sent to sched team.  mg

## 2023-12-21 DIAGNOSIS — E78.00 HYPERCHOLESTEROLEMIA: ICD-10-CM

## 2023-12-21 DIAGNOSIS — I25.10 CORONARY ARTERY DISEASE INVOLVING NATIVE CORONARY ARTERY OF NATIVE HEART WITHOUT ANGINA PECTORIS: ICD-10-CM

## 2023-12-21 DIAGNOSIS — Z98.890 STATUS POST CORONARY ANGIOGRAM: Primary | ICD-10-CM

## 2023-12-21 RX ORDER — EZETIMIBE 10 MG/1
10 TABLET ORAL DAILY
Qty: 90 TABLET | Refills: 3 | Status: SHIPPED | OUTPATIENT
Start: 2023-12-21

## 2024-03-03 ENCOUNTER — HEALTH MAINTENANCE LETTER (OUTPATIENT)
Age: 69
End: 2024-03-03

## 2024-03-11 ENCOUNTER — OFFICE VISIT (OUTPATIENT)
Dept: CARDIOLOGY | Facility: CLINIC | Age: 69
End: 2024-03-11
Payer: MEDICARE

## 2024-03-11 VITALS
SYSTOLIC BLOOD PRESSURE: 120 MMHG | BODY MASS INDEX: 22.84 KG/M2 | WEIGHT: 178 LBS | DIASTOLIC BLOOD PRESSURE: 56 MMHG | HEART RATE: 74 BPM | HEIGHT: 74 IN | RESPIRATION RATE: 16 BRPM

## 2024-03-11 DIAGNOSIS — Z98.890 STATUS POST CORONARY ANGIOGRAM: ICD-10-CM

## 2024-03-11 DIAGNOSIS — I25.10 CORONARY ARTERY DISEASE INVOLVING NATIVE CORONARY ARTERY OF NATIVE HEART WITHOUT ANGINA PECTORIS: Primary | ICD-10-CM

## 2024-03-11 DIAGNOSIS — E78.00 HYPERCHOLESTEROLEMIA: ICD-10-CM

## 2024-03-11 PROCEDURE — 99214 OFFICE O/P EST MOD 30 MIN: CPT | Performed by: INTERNAL MEDICINE

## 2024-03-11 NOTE — LETTER
3/11/2024    Provider Not In System       RE: Rito Solano       Dear Colleague,     I had the pleasure of seeing Rito Solano in the [facility].      Thank you for asking the [facility] team to see Mr. Rito Solano to follow-up on coronary artery disease, hypercholesterolemia.    Assessment/Recommendations   Assessment:    1.  Coronary artery disease, status post coronary angiogram in October 2023 demonstrating mild to moderate diffuse coronary artery disease with the most significant lesion being a 50 to 70% stenosis in the proximal to mid LAD which was not flow-limiting.  He elected to go on medical therapy and has actually done quite well with no reports of exertional chest discomfort or dyspnea.  His burning chest discomfort has since resolved.  At this point we will continue with medical management and risk factor modification.  2.  Hypercholesterolemia, currently treated with Zetia as he was intolerant of statin.  Recommended follow-up lipid profile to see where his LDL is running.    Plan:  1.  Continue current medication  2.  Check lipid profile with further recommendations to follow       History of Present Illness    Mr. Rito Solano is a 68 year old male with history of mild to moderate coronary artery disease by direct angiography in October 2023, hypercholesterolemia intolerant of statin agent now on Zetia alone who presents today for follow-up visit.  Since I last saw him in late October, he states he has been feeling well.  At that time, he had undergone coronary angiography due to an abnormal CT coronary angiogram.  The direct angiogram documented moderate to severe single-vessel disease involving a 50 to 70% stenosis in the proximal to mid LAD which was not flow-limiting.  Decision was made by the patient to continue with medical management and avoid stent placement.  When he was seen in follow-up several weeks later, he reported resolution of symptoms.  He actually  "elected not to go on beta-blocker therapy but was just on aspirin.  Currently denies recurrence of the burning chest discomfort for which I had seen him back in July 2023.  Denies any chest or arm discomfort with physical activity.    ECG (personally reviewed): No ECG today    Cardiac Imaging Studies (personally reviewed): No recent imaging     Physical Examination Review of Systems   /56 (BP Location: Right arm, Patient Position: Sitting, Cuff Size: Adult Large)   Pulse 74   Resp 16   Ht 1.88 m (6' 2\")   Wt 80.7 kg (178 lb)   BMI 22.85 kg/m    Body mass index is 22.85 kg/m .  Wt Readings from Last 3 Encounters:   03/11/24 80.7 kg (178 lb)   11/15/23 81.8 kg (180 lb 6.4 oz)   10/26/23 82.1 kg (180 lb 14.4 oz)     General Appearance:   Awake, Alert, No acute distress.   HEENT:  No scleral icterus; the mucous membranes were pink and moist.   Neck: No cervical bruits or jugular venous distention    Chest: The spine was straight. The chest was symmetric.   Lungs:   Respirations unlabored; the lungs are clear to auscultation. No wheezing   Cardiovascular:   Regular rate and rhythm.  S1, S2 normal.  No murmur or gallop   Abdomen:  No organomegaly, masses, bruits, or tenderness. Bowels sounds are present   Extremities: No peripheral edema bilaterally   Skin: No xanthelasma. Warm, Dry.   Musculoskeletal: No tenderness.   Neurologic: Mood and affect are appropriate.    Enc Vitals  BP: 120/56  Pulse: 74  Resp: 16  Weight: 80.7 kg (178 lb) (With shoes.)  Height: 188 cm (6' 2\")                                         Medical History  Surgical History Family History Social History   Past Medical History:   Diagnosis Date     Irregular heart beat     Past Surgical History:   Procedure Laterality Date     CV CORONARY ANGIOGRAM N/A 10/27/2023    Procedure: Coronary Angiogram;  Surgeon: Phong Eng MD;  Location: [facility] CATH LAB CV     CV INSTANTANEOUS WAVE-FREE RATIO N/A 10/27/2023    Procedure: Instantaneous " Wave-Free Ratio;  Surgeon: Phong Eng MD;  Location: [facility] LAB CV     HERNIORRHAPHY INGUINAL Right 7/12/2022    Procedure: HERNIORRHAPHY, INGUINAL, OPEN;  Surgeon: Elyse Murrell DO;  Location: [place]     PAROTIDECTOMY       WISDOM TOOTH EXTRACTION      Family History   Problem Relation Age of Onset     Diabetes Father      Coronary Artery Disease Brother 63        cabg     Heart Disease Brother      Coronary Artery Disease Brother 63        cabg     Heart Disease Brother     Social History     Socioeconomic History     Marital status:      Spouse name: Not on file     Number of children: Not on file     Years of education: Not on file     Highest education level: Not on file   Occupational History     Not on file   Tobacco Use     Smoking status: Never     Smokeless tobacco: Never   Substance and Sexual Activity     Alcohol use: Yes     Comment: 1-2 / week     Drug use: No     Sexual activity: Not on file   Other Topics Concern     Parent/sibling w/ CABG, MI or angioplasty before 65F 55M? No   Social History Narrative     Not on file     Social Determinants of Health     Financial Resource Strain: Not on file   Food Insecurity: Not on file   Transportation Needs: Not on file   Physical Activity: Not on file   Stress: Not on file   Social Connections: Not on file   Interpersonal Safety: Not on file   Housing Stability: Not on file          Medications  Allergies   Current Outpatient Medications   Medication Sig Dispense Refill     aspirin (ASA) 81 MG chewable tablet Take 81 mg by mouth daily       ezetimibe (ZETIA) 10 MG tablet Take 1 tablet (10 mg) by mouth daily 90 tablet 3     pravastatin (PRAVACHOL) 20 MG tablet Take 1 tablet (20 mg) by mouth daily (Patient not taking: Reported on 3/11/2024) 30 tablet 11      Allergies   Allergen Reactions     Statins Muscle Pain (Myalgia)     Pt was getting brain fog, stomach upset, headache, higher BP.          Lab Results    Chemistry/lipid  CBC Cardiac Enzymes/BNP/TSH/INR   Recent Labs   Lab Test 11/15/23  1041 10/27/23  0804   TRIG 68 80   LDL 94 87   BUN  --  17.8   NA  --  141   CO2  --  27    Recent Labs   Lab Test 10/27/23  0804   WBC 3.9*   HGB 13.8   HCT 41.1   MCV 91   *    Recent Labs   Lab Test 07/14/23  1332 03/28/22  1436   TROPONINI <0.01  --    TSH  --  1.08        A total of 30 minutes was spent reviewing patient's medical records, obtaining history and performing examination, as well as discussing diagnoses/ recommendations with patient and answering all questions.                        Thank you for allowing me to participate in the care of your patient.      Sincerely,     Alejandra Vazquez MD     [facility]  cc:   Yolanda Prince PA-C  [address] [place] [address]

## 2024-03-11 NOTE — PROGRESS NOTES
Thank you for asking the [facility] team to see Mr. Rito Solano to follow-up on coronary artery disease, hypercholesterolemia.    Assessment/Recommendations   Assessment:    1.  Coronary artery disease, status post coronary angiogram in October 2023 demonstrating mild to moderate diffuse coronary artery disease with the most significant lesion being a 50 to 70% stenosis in the proximal to mid LAD which was not flow-limiting.  He elected to go on medical therapy and has actually done quite well with no reports of exertional chest discomfort or dyspnea.  His burning chest discomfort has since resolved.  At this point we will continue with medical management and risk factor modification.  2.  Hypercholesterolemia, currently treated with Zetia as he was intolerant of statin.  Recommended follow-up lipid profile to see where his LDL is running.    Plan:  1.  Continue current medication  2.  Check lipid profile with further recommendations to follow       History of Present Illness    Mr. Rito Solano is a 68 year old male with history of mild to moderate coronary artery disease by direct angiography in October 2023, hypercholesterolemia intolerant of statin agent now on Zetia alone who presents today for follow-up visit.  Since I last saw him in late October, he states he has been feeling well.  At that time, he had undergone coronary angiography due to an abnormal CT coronary angiogram.  The direct angiogram documented moderate to severe single-vessel disease involving a 50 to 70% stenosis in the proximal to mid LAD which was not flow-limiting.  Decision was made by the patient to continue with medical management and avoid stent placement.  When he was seen in follow-up several weeks later, he reported resolution of symptoms.  He actually elected not to go on beta-blocker therapy but was just on aspirin.  Currently denies recurrence of the burning chest discomfort for which I had seen him back in  "July 2023.  Denies any chest or arm discomfort with physical activity.    ECG (personally reviewed): No ECG today    Cardiac Imaging Studies (personally reviewed): No recent imaging     Physical Examination Review of Systems   /56 (BP Location: Right arm, Patient Position: Sitting, Cuff Size: Adult Large)   Pulse 74   Resp 16   Ht 1.88 m (6' 2\")   Wt 80.7 kg (178 lb)   BMI 22.85 kg/m    Body mass index is 22.85 kg/m .  Wt Readings from Last 3 Encounters:   03/11/24 80.7 kg (178 lb)   11/15/23 81.8 kg (180 lb 6.4 oz)   10/26/23 82.1 kg (180 lb 14.4 oz)     General Appearance:   Awake, Alert, No acute distress.   HEENT:  No scleral icterus; the mucous membranes were pink and moist.   Neck: No cervical bruits or jugular venous distention    Chest: The spine was straight. The chest was symmetric.   Lungs:   Respirations unlabored; the lungs are clear to auscultation. No wheezing   Cardiovascular:   Regular rate and rhythm.  S1, S2 normal.  No murmur or gallop   Abdomen:  No organomegaly, masses, bruits, or tenderness. Bowels sounds are present   Extremities: No peripheral edema bilaterally   Skin: No xanthelasma. Warm, Dry.   Musculoskeletal: No tenderness.   Neurologic: Mood and affect are appropriate.    Enc Vitals  BP: 120/56  Pulse: 74  Resp: 16  Weight: 80.7 kg (178 lb) (With shoes.)  Height: 188 cm (6' 2\")                                         Medical History  Surgical History Family History Social History   Past Medical History:   Diagnosis Date    Irregular heart beat     Past Surgical History:   Procedure Laterality Date    CV CORONARY ANGIOGRAM N/A 10/27/2023    Procedure: Coronary Angiogram;  Surgeon: Phong Eng MD;  [facility]: [facility] CATH [facility] CV    CV INSTANTANEOUS WAVE-FREE RATIO N/A 10/27/2023    Procedure: Instantaneous Wave-Free Ratio;  Surgeon: Phong Eng MD;  Location: [facility] CV    HERNIORRHAPHY INGUINAL Right 7/12/2022    Procedure: HERNIORRHAPHY, INGUINAL, OPEN; "  Surgeon: Elyse Murrell DO;  Location: [place] [facility]    PAROTIDECTOMY      WISDOM TOOTH EXTRACTION      Family History   Problem Relation Age of Onset    Diabetes Father     Coronary Artery Disease Brother 63        cabg    Heart Disease Brother     Coronary Artery Disease Brother 63        cabg    Heart Disease Brother     Social History     Socioeconomic History    Marital status:      Spouse name: Not on file    Number of children: Not on file    Years of education: Not on file    Highest education level: Not on file   Occupational History    Not on file   Tobacco Use    Smoking status: Never    Smokeless tobacco: Never   Substance and Sexual Activity    Alcohol use: Yes     Comment: 1-2 / week    Drug use: No    Sexual activity: Not on file   Other Topics Concern    Parent/sibling w/ CABG, MI or angioplasty before 65F 55M? No   Social History Narrative    Not on file     Social Determinants of Health     Financial Resource Strain: Not on file   Food Insecurity: Not on file   Transportation Needs: Not on file   Physical Activity: Not on file   Stress: Not on file   Social Connections: Not on file   Interpersonal Safety: Not on file   Housing Stability: Not on file          Medications  Allergies   Current Outpatient Medications   Medication Sig Dispense Refill    aspirin (ASA) 81 MG chewable tablet Take 81 mg by mouth daily      ezetimibe (ZETIA) 10 MG tablet Take 1 tablet (10 mg) by mouth daily 90 tablet 3    pravastatin (PRAVACHOL) 20 MG tablet Take 1 tablet (20 mg) by mouth daily (Patient not taking: Reported on 3/11/2024) 30 tablet 11      Allergies   Allergen Reactions    Statins Muscle Pain (Myalgia)     Pt was getting brain fog, stomach upset, headache, higher BP.          Lab Results    Chemistry/lipid CBC Cardiac Enzymes/BNP/TSH/INR   Recent Labs   Lab Test 11/15/23  1041 10/27/23  0804   TRIG 68 80   LDL 94 87   BUN  --  17.8   NA  --  141   CO2  --  27    Recent Labs   Lab Test  10/27/23  0804   WBC 3.9*   HGB 13.8   HCT 41.1   MCV 91   *    Recent Labs   Lab Test 07/14/23  1332 03/28/22  1436   TROPONINI <0.01  --    TSH  --  1.08        A total of 30 minutes was spent reviewing patient's medical records, obtaining history and performing examination, as well as discussing diagnoses/ recommendations with patient and answering all questions.

## 2024-03-12 NOTE — PROGRESS NOTES
Msg rec'd 3-11-24 @ 1717:  Alejandra Vazquez MD Gorshe, Lara, RN  Can you let Sylvie know that his LDL came back at 74 which is reasonable.  Will continue with his Zetia alone for now.  KML    Response posted to [employer] - follow-up pending in 1yr.  mg

## 2024-06-28 ENCOUNTER — TELEPHONE (OUTPATIENT)
Dept: FAMILY MEDICINE | Facility: CLINIC | Age: 69
End: 2024-06-28
Payer: MEDICARE

## 2024-06-28 NOTE — TELEPHONE ENCOUNTER
Patient Quality Outreach    Patient is due for the following:   Physical Annual Wellness Visit      Topic Date Due    Pneumococcal Vaccine (1 of 2 - PCV) Never done    Diptheria Tetanus Pertussis (DTAP/TDAP/TD) Vaccine (1 - Tdap) Never done    Zoster (Shingles) Vaccine (1 of 2) Never done    COVID-19 Vaccine (1 - 2023-24 season) Never done       Next Steps:   Patient declined follow up at this time.    Type of outreach:    Phone, spoke to patient/parent. Patient will call his primary to schedule appointment there.     Next Steps:  Reach out within 90 days via Phone.    Max number of attempts reached: Yes. Will try again in 90 days if patient still on fail list.    Questions for provider review:    None           Mendel Conteh MA  Chart routed to Care Team.

## 2025-03-15 ENCOUNTER — HEALTH MAINTENANCE LETTER (OUTPATIENT)
Age: 70
End: 2025-03-15

## 2025-04-11 SDOH — HEALTH STABILITY: PHYSICAL HEALTH: ON AVERAGE, HOW MANY DAYS PER WEEK DO YOU ENGAGE IN MODERATE TO STRENUOUS EXERCISE (LIKE A BRISK WALK)?: 1 DAY

## 2025-04-11 SDOH — HEALTH STABILITY: PHYSICAL HEALTH: ON AVERAGE, HOW MANY MINUTES DO YOU ENGAGE IN EXERCISE AT THIS LEVEL?: 10 MIN

## 2025-04-11 ASSESSMENT — SOCIAL DETERMINANTS OF HEALTH (SDOH): HOW OFTEN DO YOU GET TOGETHER WITH FRIENDS OR RELATIVES?: ONCE A WEEK

## 2025-04-15 ENCOUNTER — OFFICE VISIT (OUTPATIENT)
Dept: FAMILY MEDICINE | Facility: CLINIC | Age: 70
End: 2025-04-15
Payer: MEDICARE

## 2025-04-15 ENCOUNTER — ORDERS ONLY (AUTO-RELEASED) (OUTPATIENT)
Dept: FAMILY MEDICINE | Facility: CLINIC | Age: 70
End: 2025-04-15

## 2025-04-15 VITALS
BODY MASS INDEX: 22.74 KG/M2 | SYSTOLIC BLOOD PRESSURE: 128 MMHG | OXYGEN SATURATION: 97 % | HEART RATE: 77 BPM | WEIGHT: 177.2 LBS | DIASTOLIC BLOOD PRESSURE: 60 MMHG | TEMPERATURE: 97.4 F | RESPIRATION RATE: 16 BRPM | HEIGHT: 74 IN

## 2025-04-15 DIAGNOSIS — I25.10 CORONARY ARTERY DISEASE INVOLVING NATIVE CORONARY ARTERY OF NATIVE HEART WITHOUT ANGINA PECTORIS: ICD-10-CM

## 2025-04-15 DIAGNOSIS — R73.03 PREDIABETES: ICD-10-CM

## 2025-04-15 DIAGNOSIS — Z00.00 ENCOUNTER FOR MEDICARE ANNUAL WELLNESS EXAM: Primary | ICD-10-CM

## 2025-04-15 DIAGNOSIS — Z12.11 SCREENING FOR COLON CANCER: ICD-10-CM

## 2025-04-15 DIAGNOSIS — N63.41 SUBAREOLAR LUMP OF RIGHT BREAST: ICD-10-CM

## 2025-04-15 LAB
ANION GAP SERPL CALCULATED.3IONS-SCNC: 13 MMOL/L (ref 7–15)
BUN SERPL-MCNC: 22 MG/DL (ref 8–23)
CALCIUM SERPL-MCNC: 9.8 MG/DL (ref 8.8–10.4)
CHLORIDE SERPL-SCNC: 102 MMOL/L (ref 98–107)
CHOLEST SERPL-MCNC: 155 MG/DL
CREAT SERPL-MCNC: 0.86 MG/DL (ref 0.67–1.17)
EGFRCR SERPLBLD CKD-EPI 2021: >90 ML/MIN/1.73M2
ERYTHROCYTE [DISTWIDTH] IN BLOOD BY AUTOMATED COUNT: 11.7 % (ref 10–15)
EST. AVERAGE GLUCOSE BLD GHB EST-MCNC: 114 MG/DL
FASTING STATUS PATIENT QL REPORTED: NO
FASTING STATUS PATIENT QL REPORTED: NO
GLUCOSE SERPL-MCNC: 104 MG/DL (ref 70–99)
HBA1C MFR BLD: 5.6 % (ref 0–5.6)
HCO3 SERPL-SCNC: 27 MMOL/L (ref 22–29)
HCT VFR BLD AUTO: 42.4 % (ref 40–53)
HDLC SERPL-MCNC: 45 MG/DL
HGB BLD-MCNC: 14.2 G/DL (ref 13.3–17.7)
LDLC SERPL CALC-MCNC: 96 MG/DL
MCH RBC QN AUTO: 31.4 PG (ref 26.5–33)
MCHC RBC AUTO-ENTMCNC: 33.5 G/DL (ref 31.5–36.5)
MCV RBC AUTO: 94 FL (ref 78–100)
NONHDLC SERPL-MCNC: 110 MG/DL
PLATELET # BLD AUTO: 104 10E3/UL (ref 150–450)
POTASSIUM SERPL-SCNC: 4.2 MMOL/L (ref 3.4–5.3)
RBC # BLD AUTO: 4.52 10E6/UL (ref 4.4–5.9)
SODIUM SERPL-SCNC: 142 MMOL/L (ref 135–145)
TRIGL SERPL-MCNC: 71 MG/DL
TSH SERPL DL<=0.005 MIU/L-ACNC: 1.36 UIU/ML (ref 0.3–4.2)
WBC # BLD AUTO: 4.7 10E3/UL (ref 4–11)

## 2025-04-15 PROCEDURE — 84443 ASSAY THYROID STIM HORMONE: CPT

## 2025-04-15 PROCEDURE — 80061 LIPID PANEL: CPT

## 2025-04-15 PROCEDURE — 99213 OFFICE O/P EST LOW 20 MIN: CPT | Mod: 25

## 2025-04-15 PROCEDURE — 85027 COMPLETE CBC AUTOMATED: CPT

## 2025-04-15 PROCEDURE — 3078F DIAST BP <80 MM HG: CPT

## 2025-04-15 PROCEDURE — G0438 PPPS, INITIAL VISIT: HCPCS

## 2025-04-15 PROCEDURE — 36415 COLL VENOUS BLD VENIPUNCTURE: CPT

## 2025-04-15 PROCEDURE — 3074F SYST BP LT 130 MM HG: CPT

## 2025-04-15 PROCEDURE — 83036 HEMOGLOBIN GLYCOSYLATED A1C: CPT

## 2025-04-15 PROCEDURE — 80048 BASIC METABOLIC PNL TOTAL CA: CPT

## 2025-04-15 SDOH — HEALTH STABILITY: PHYSICAL HEALTH: ON AVERAGE, HOW MANY DAYS PER WEEK DO YOU ENGAGE IN MODERATE TO STRENUOUS EXERCISE (LIKE A BRISK WALK)?: 1 DAY

## 2025-04-15 SDOH — HEALTH STABILITY: PHYSICAL HEALTH: ON AVERAGE, HOW MANY MINUTES DO YOU ENGAGE IN EXERCISE AT THIS LEVEL?: 10 MIN

## 2025-04-15 ASSESSMENT — SOCIAL DETERMINANTS OF HEALTH (SDOH): HOW OFTEN DO YOU GET TOGETHER WITH FRIENDS OR RELATIVES?: ONCE A WEEK

## 2025-04-15 NOTE — PATIENT INSTRUCTIONS
Thank you for seeing us at [facility].     To Review:  If you are getting lab work today, we will send you a [employer] message with recommendations once these are all returned to us.  X-rays are able to be performed in clinic and we will notify you of the results.  Any other imaging is scheduled and you will be contacted by the scheduling department.  If you do not hear from them in 2 weeks, please call [phone]   If you are getting immunizations today, you may have some arm soreness; you can use tylenol or ibuprofen for this.  If you are getting referrals you should be called within the next 3 to 5 days.    An E visit is an excellent way to get quick evaluation from myself. These can be completed using the  [employer] or online using [employer]. We can evaluate a variety of conditions using this including sinusitis, skin conditions, etc. Please send us a [employer] Message or call if having issues or questions.    Jose F Pulido DO, MS  [facility]  [phone]    Patient Education   Preventive Care Advice   This is general advice given by our system to help you stay healthy. However, your care team may have specific advice just for you. Please talk to your care team about your preventive care needs.  Nutrition  Eat 5 or more servings of fruits and vegetables each day.  Try wheat bread, brown rice and whole grain pasta (instead of white bread, rice, and pasta).  Get enough calcium and vitamin D. Check the label on foods and aim for 100% of the RDA (recommended daily allowance).  Lifestyle  Exercise at least 150 minutes each week  (30 minutes a day, 5 days a week).  Do muscle strengthening activities 2 days a week. These help control your weight and prevent disease.  No smoking.  Wear sunscreen to prevent skin cancer.  Have a dental exam and cleaning every 6 months.  Yearly exams  See your health care team every year to talk about:  Any changes in your health.  Any  medicines your care team has prescribed.  Preventive care, family planning, and ways to prevent chronic diseases.  Shots (vaccines)   HPV shots (up to age 26), if you've never had them before.  Hepatitis B shots (up to age 59), if you've never had them before.  COVID-19 shot: Get this shot when it's due.  Flu shot: Get a flu shot every year.  Tetanus shot: Get a tetanus shot every 10 years.  Pneumococcal, hepatitis A, and RSV shots: Ask your care team if you need these based on your risk.  Shingles shot (for age 50 and up)  General health tests  Diabetes screening:  Starting at age 35, Get screened for diabetes at least every 3 years.  If you are younger than age 35, ask your care team if you should be screened for diabetes.  Cholesterol test: At age 39, start having a cholesterol test every 5 years, or more often if advised.  Bone density scan (DEXA): At age 50, ask your care team if you should have this scan for osteoporosis (brittle bones).  Hepatitis C: Get tested at least once in your life.  STIs (sexually transmitted infections)  Before age 24: Ask your care team if you should be screened for STIs.  After age 24: Get screened for STIs if you're at risk. You are at risk for STIs (including HIV) if:  You are sexually active with more than one person.  You don't use condoms every time.  You or a partner was diagnosed with a sexually transmitted infection.  If you are at risk for HIV, ask about PrEP medicine to prevent HIV.  Get tested for HIV at least once in your life, whether you are at risk for HIV or not.  Cancer screening tests  Cervical cancer screening: If you have a cervix, begin getting regular cervical cancer screening tests starting at age 21.  Breast cancer scan (mammogram): If you've ever had breasts, begin having regular mammograms starting at age 40. This is a scan to check for breast cancer.  Colon cancer screening: It is important to start screening for colon cancer at age 45.  Have a colonoscopy  test every 10 years (or more often if you're at risk) Or, ask your provider about stool tests like a FIT test every year or Cologuard test every 3 years.  To learn more about your testing options, visit:   .  For help making a decision, visit:   [email or web address].ham/ue29522.  Prostate cancer screening test: If you have a prostate, ask your care team if a prostate cancer screening test (PSA) at age 55 is right for you.  Lung cancer screening: If you are a current or former smoker ages 50 to 80, ask your care team if ongoing lung cancer screenings are right for you.  For informational purposes only. Not to replace the advice of your health care provider. Copyright   2023 [employer]. All rights reserved. Clinically reviewed by the [facility] Transitions Program. [employer] 366993 - REV 01/24.  Learning About Activities of Daily Living  What are activities of daily living?     Activities of daily living (ADLs) are the basic self-care tasks you do every day. These include eating, bathing, dressing, and moving around.  As you age, and if you have health problems, you may find that it's harder to do some of these tasks. If so, your doctor can suggest ideas that may help.  To measure what kind of help you may need, your doctor will ask how well you are able to do ADLs. Let your doctor know if there are any tasks that you are having trouble doing. This is an important first step to getting help. And when you have the help you need, you can stay as independent as possible.  How will a doctor assess your ADLs?  Asking about ADLs is part of a routine health checkup your doctor will likely do as you age. Your health check might be done in a doctor's office, in your home, or at a hospital. The goal is to find out if you are having any problems that could make it hard to care for yourself or that make it unsafe for you to be on your own.  To measure your ADLs, your doctor will ask how hard it is for you to do  routine tasks. Your doctor may also want to know if you have changed the way you do a task because of a health problem. Your doctor may watch how you:  Walk back and forth.  Keep your balance while you stand or walk.  Move from sitting to standing or from a bed to a chair.  Button or unbutton a shirt or sweater.  Remove and put on your shoes.  It's common to feel a little worried or anxious if you find you can't do all the things you used to be able to do. Talking with your doctor about ADLs is a way to make sure you're as safe as possible and able to care for yourself as well as you can. You may want to bring a caregiver, friend, or family member to your checkup. They can help you talk to your doctor.  Follow-up care is a key part of your treatment and safety. Be sure to make and go to all appointments, and call your doctor if you are having problems. It's also a good idea to know your test results and keep a list of the medicines you take.  Current as of: October 24, 2024  Content Version: 14.4    3870-0545 [employer].   Care instructions adapted under license by your healthcare professional. If you have questions about a medical condition or this instruction, always ask your healthcare professional. [employer] disclaims any warranty or liability for your use of this information.

## 2025-04-15 NOTE — PROGRESS NOTES
Preventive Care Visit  [facility]  Jose F Pulido DO, Family Medicine  Apr 15, 2025      Assessment & Plan     Encounter for Medicare annual wellness exam    Pleasant 69-year-old male  Here with acute complaints below as well as chronic conditions    Colon Cancer: never.  Patient agreeable to Cologuard today  Dental: Goes approximately twice per year, recommend going twice per year  Vision:glasses when drive  IPV screening: Feels safe at home.    Social determinants of health: no housing insecurity, appropriate health literacy, no food insecurities  Lipid profile: last one year.  Reorder    Should be getting 5 servings of fruits and vegetables per day  Should be completing 150 minutes of brisk physical activity a week    Follow Up with PCP in 1 year or sooner for acute complaints      Screening for colon cancer  Never had a colon cancer screen  No changes  No family history  Patient is aware that if Cologuard is positive he will need to go through a full colonoscopy    - COLOGUARD([employer]); Future    Prediabetes  Diagnosed approximately 2 years ago with an A1c of 5.9  Will recheck  We will send patient a [facility] [facility] message with results    - Hemoglobin A1c  - Basic metabolic panel  - TSH with free T4 reflex    Coronary artery disease involving native coronary artery of native heart without angina pectoris    50 to 70% stenosis of the LAD  Follows with Dr. Vazquez  Currently only on Zetia  Will calculate ASCVD and the patient know  - Lipid panel reflex to direct LDL Non-fasting; Future  - CBC with platelets; Future  - Lipid panel reflex to direct LDL Non-fasting  - CBC with platelets    Subareolar lump of right breast  Approximately 3 to 4 years ago  No trauma  No nipple discharge  No changes to the skin  No erythema  No unintentional weight loss or night sweats  No family history of breast cancer  Sharp painful on palpation  2 cm x 2 cm on palpation  No axillary lymphadenopathy  Suspect  cystic lesion however cannot be sure  Patient wants to start with an ultrasound and biopsy.  If positive will do a mammogram    - US Breast Right Limited 1-3 Quadrants; Future    Patient has been advised of split billing requirements and indicates understanding: Yes        Counseling  Appropriate preventive services were addressed with this patient via screening, questionnaire, or discussion as appropriate for fall prevention, nutrition, physical activity, Tobacco-use cessation, social engagement, weight loss and cognition.  Checklist reviewing preventive services available has been given to the patient.  Reviewed patient's diet, addressing concerns and/or questions.   He is at risk for lack of exercise and has been provided with information to increase physical activity for the benefit of his well-being.   Patient reported safety concerns were addressed today.        Calin Veras is a 69 year old, presenting for the following:  Annual Visit (Not fasting. Lump under right breast x noticed abour 3-4wks ago - was tender but not as bad from when pt first noticed lump )        4/15/2025     9:16 AM   Additional Questions   Roomed by Penny MCMULLEN   Accompanied by SELF           HPI    Breast Lump  3-4 weeks ago he noticed there was some right breast pain  Noticed when crossing his arms  Right underneath the nipple  Sharp pain hen touch it  No trauma to the area  Recently changed to sleep on his right side - has tried not to sleep on it  Has not looked like there has been infection  Does not move  Smaller and less painful now  No nipple discharge.   No unintentional weight loss  No night sweats  No family history of breast, tescitular, prostate, ovarian, colon    CAD  Was working on farm house  Was hot muggy day and became shortness of breath  Tried to relax but did not recover  Did an angiogram and had stenosis of LAD  Previously on pravastatin but had side effects    PMH  CAD - on zetia  Seasonal allergies    Meds  As  reflected    PSH  Hernia repair    Lives with: wife. No kiddos  Work: retired -   Hobbies:  gardening in summer, shovel snow.   Diet: low fat. Low cholesterol.    Physical Activity: minimal  Sleep: 10 hours    GENERAL: No fever, chills, weight loss or gain  HEENT: No headache, trauma, changes in vision, hearing, nasal congestion, dental pain, neck pain, sore throat.  CARDIAC: Denies chest pain or shortness of breath  LUNG: Denies dyspnea on exertion or chest pain  ABD: Denies pain, nausea, vomiting, diarrhea, constipation  : No changes in bladder habits  SKIN: Denies new rashes  NEURO: No numbness, weakness, tingling   MSK: No weakness  PSYCH: No changes in mood, no HI or SI                    Advance Care Planning  Patient does not have a Health Care Directive: Discussed advance care planning with patient; however, patient declined at this time.      4/15/2025   General Health   How would you rate your overall physical health? Good   Feel stress (tense, anxious, or unable to sleep) Not at all         4/15/2025   Nutrition   Diet: Low fat/cholesterol         4/15/2025   Exercise   Days per week of moderate/strenous exercise 1 day   Average minutes spent exercising at this level 10 min   (!) EXERCISE CONCERN      4/15/2025   Social Factors   Frequency of gathering with friends or relatives Once a week   Worry food won't last until get money to buy more No   Food not last or not have enough money for food? No   Do you have housing? (Housing is defined as stable permanent housing and does not include staying ouside in a car, in a tent, in an abandoned building, in an overnight shelter, or couch-surfing.) Yes   Are you worried about losing your housing? No   Lack of transportation? No   Unable to get utilities (heat,electricity)? No         4/15/2025   Fall Risk   Fallen 2 or more times in the past year? No    Trouble with walking or balance? No        Proxy-reported          4/15/2025   Activities of Daily  "Living- Home Safety   Needs help with the following daily activites None of the above   Safety concerns in the home No grab bars in the bathroom         4/15/2025   Dental   Dentist two times every year? Yes         4/15/2025   Hearing Screening   Hearing concerns? None of the above         4/15/2025   Driving Risk Screening   Patient/family members have concerns about driving No         4/15/2025   General Alertness/Fatigue Screening   Have you been more tired than usual lately? No         4/15/2025   Urinary Incontinence Screening   Bothered by leaking urine in past 6 months No           Today's PHQ-2 Score:       4/15/2025     9:07 AM   PHQ-2 ( 1999 [employer])   Q1: Little interest or pleasure in doing things 0   Q2: Feeling down, depressed or hopeless 0   PHQ-2 Score 0    Q1: Little interest or pleasure in doing things Not at all   Q2: Feeling down, depressed or hopeless Not at all   PHQ-2 Score 0       Patient-reported           4/15/2025   Substance Use   Alcohol more than 3/day or more than 7/wk No   Do you have a current opioid prescription? No   How severe/bad is pain from 1 to 10? 0/10 (No Pain)   Do you use any other substances recreationally? No     Social History     Tobacco Use    Smoking status: Never     Passive exposure: Never    Smokeless tobacco: Never   Vaping Use    Vaping status: Never Used   Substance Use Topics    Alcohol use: Yes     Comment: 1-2 / week    Drug use: No           4/15/2025   AAA Screening   Family history of Abdominal Aortic Aneurysm (AAA)? No   Last PSA: No results found for: \"PSA\"  ASCVD Risk   The 10-year ASCVD risk score (Jeremy SHIRLEY, et al., 2019) is: 12.7%    Values used to calculate the score:      Age: 69 years      Sex: Male      Is Non- : No      Diabetic: No      Tobacco smoker: No      Systolic Blood Pressure: 128 mmHg      Is BP treated: No      HDL Cholesterol: 51 mg/dL      Total Cholesterol: 159 mg/dL            Reviewed and updated " "as needed this visit by Provider   Tobacco       Fam Hx              Current providers sharing in care for this patient include:  Patient Care Team:  System, Provider Not In as PCP - General (Clinic)  Alejandra Vazquez MD as MD (Cardiovascular Disease)  Clinic - [facility] as Assigned PCP  Yolanda Prince PA-C as Assigned Heart and Vascular Provider    The following health maintenance items are reviewed in Epic and correct as of today:  Health Maintenance   Topic Date Due    COLORECTAL CANCER SCREENING  Never done    HEPATITIS C SCREENING  Never done    DTAP/TDAP/TD IMMUNIZATION (1 - Tdap) Never done    Pneumococcal Vaccine: 50+ Years (1 of 1 - PCV) Never done    ZOSTER IMMUNIZATION (1 of 2) Never done    RSV VACCINE (1 - Risk 60-74 years 1-dose series) Never done    INFLUENZA VACCINE (1) Never done    COVID-19 Vaccine (1 - 2024-25 season) Never done    LIPID  11/15/2024    MEDICARE ANNUAL WELLNESS VISIT  04/15/2026    ANNUAL REVIEW OF HM ORDERS  04/15/2026    FALL RISK ASSESSMENT  04/15/2026    DIABETES SCREENING  11/15/2026    ADVANCE CARE PLANNING  04/15/2030    PHQ-2 (once per calendar year)  Completed    HPV IMMUNIZATION  Aged Out    MENINGITIS IMMUNIZATION  Aged Out            Objective    Exam  /60   Pulse 77   Temp 97.4  F (36.3  C) (Oral)   Resp 16   Ht 1.892 m (6' 2.49\")   Wt 80.4 kg (177 lb 3.2 oz)   SpO2 97%   BMI 22.45 kg/m     Estimated body mass index is 22.45 kg/m  as calculated from the following:    Height as of this encounter: 1.892 m (6' 2.49\").    Weight as of this encounter: 80.4 kg (177 lb 3.2 oz).    Physical Exam  Vitals reviewed.   Constitutional:       Appearance: Normal appearance.   HENT:      Head: Normocephalic and atraumatic.      Right Ear: External ear normal. There is impacted cerumen.      Left Ear: External ear normal. There is impacted cerumen.      Nose: Nose normal.      Mouth/Throat:      Mouth: Mucous membranes are moist.   Eyes:      Extraocular " Movements: Extraocular movements intact.      Pupils: Pupils are equal, round, and reactive to light.   Cardiovascular:      Rate and Rhythm: Normal rate and regular rhythm.      Heart sounds: Normal heart sounds.   Pulmonary:      Effort: Pulmonary effort is normal.      Breath sounds: Normal breath sounds.   Chest:          Comments: Right: 2 cm x 2 cm mobile lesion at approximately 4:00 subareolar  No discharge  No erythema  No orange peel skin changes  No axillary lymphadenopathy  Left: No appreciable changes    Abdominal:      General: Abdomen is flat. Bowel sounds are normal.      Palpations: Abdomen is soft.   Musculoskeletal:      Cervical back: Normal range of motion and neck supple.      Comments: Appropriate strength in tested fields   Skin:     General: Skin is warm and dry.   Neurological:      General: No focal deficit present.      Mental Status: He is alert.   Psychiatric:         Mood and Affect: Mood normal.         Behavior: Behavior normal.               4/15/2025   Mini Cog   Clock Draw Score 2 Normal   3 Item Recall 3 objects recalled   Mini Cog Total Score 5              Signed Electronically by: Jose F Pulido DO

## 2025-04-17 NOTE — RESULT ENCOUNTER NOTE
I have reviewed your diagnostic work     Kidneys and electrolytes look normal  The values of your blood cells are all within normal limits.  Your 10 year risk of a cardiovascular event (heart attack or stroke) is 13.3%.  Likely due to not being on a statin.  Zetia is appropriate with a baby aspirin  Thyroid is within normal limits  You are not prediabetic with A1c of 5.6    Please let us know if you have any questions.    Sincerely,    Dr. Pulido

## 2025-04-22 ENCOUNTER — HOSPITAL ENCOUNTER (OUTPATIENT)
Dept: MAMMOGRAPHY | Facility: CLINIC | Age: 70
Discharge: HOME | End: 2025-04-22
Payer: MEDICARE

## 2025-04-22 DIAGNOSIS — N63.41 SUBAREOLAR LUMP OF RIGHT BREAST: ICD-10-CM

## 2025-04-22 PROCEDURE — 77066 DX MAMMO INCL CAD BI: CPT

## 2025-05-16 ENCOUNTER — RESULTS FOLLOW-UP (OUTPATIENT)
Dept: GASTROENTEROLOGY | Facility: CLINIC | Age: 70
End: 2025-05-16

## 2025-05-16 NOTE — RESULT ENCOUNTER NOTE
Your Cologuard has returned positive.  It is recommended you get the full colonoscopy regardless of symptoms.  I would be happy to place the referral    Please let us know if you have any questions.    Sincerely,    Dr. Pulido

## (undated) DEVICE — CATH ANGIO 6 FR IMPULSE DIA FL3.5 SINGLE H74916599210

## (undated) DEVICE — GUIDEWIRE VASCULAR COMET II 185CML PRESSURE H74939359110

## (undated) DEVICE — KIT HAND CONTROL ACIST 014644 AR-P54

## (undated) DEVICE — SHTH INTRO 0.021IN ID 6FR DIA

## (undated) DEVICE — CUSTOM PACK CORONARY SAN5BCRHEA

## (undated) DEVICE — SYR ANGIOGRAPHY MULTIUSE KIT ACIST 014612

## (undated) DEVICE — ELECTRODE DEFIB CADENCE 22550R

## (undated) DEVICE — 27CM, LONG, R-BAND RADIAL COMPRESSION DEVICE (FORMERLY VASCULAR SOLUTIONS)

## (undated) DEVICE — MANIFOLD KIT ANGIO AUTOMATED 014613

## (undated) DEVICE — VALVE HEMOSTATIC WATCHDOG 8FR INNER LUMEN H74939343021

## (undated) DEVICE — EXCHANGE WIRE .035 260 STAR/JFC/035/260/ M001491681

## (undated) DEVICE — CATH ANGIO JUDKINS R4 6FRX100CM INFINITI 534621T

## (undated) RX ORDER — FENTANYL CITRATE 50 UG/ML
INJECTION, SOLUTION INTRAMUSCULAR; INTRAVENOUS
Status: DISPENSED
Start: 2023-10-27